# Patient Record
Sex: MALE | Race: BLACK OR AFRICAN AMERICAN | NOT HISPANIC OR LATINO | URBAN - METROPOLITAN AREA
[De-identification: names, ages, dates, MRNs, and addresses within clinical notes are randomized per-mention and may not be internally consistent; named-entity substitution may affect disease eponyms.]

---

## 2019-10-12 ENCOUNTER — INPATIENT (INPATIENT)
Facility: HOSPITAL | Age: 54
LOS: 9 days | Discharge: HOME HEALTH | DRG: 235 | End: 2019-10-22
Attending: THORACIC SURGERY (CARDIOTHORACIC VASCULAR SURGERY) | Admitting: THORACIC SURGERY (CARDIOTHORACIC VASCULAR SURGERY)
Payer: MEDICAID

## 2019-10-12 VITALS — TEMPERATURE: 98 F | WEIGHT: 187.39 LBS | HEIGHT: 69 IN

## 2019-10-12 LAB
ALBUMIN SERPL ELPH-MCNC: 3.4 G/DL — SIGNIFICANT CHANGE UP (ref 3.3–5)
ALP SERPL-CCNC: 89 U/L — SIGNIFICANT CHANGE UP (ref 40–120)
ALT FLD-CCNC: 32 U/L — SIGNIFICANT CHANGE UP (ref 10–45)
ANION GAP SERPL CALC-SCNC: 10 MMOL/L — SIGNIFICANT CHANGE UP (ref 5–17)
APTT BLD: 30.4 SEC — SIGNIFICANT CHANGE UP (ref 27.5–36.3)
AST SERPL-CCNC: 27 U/L — SIGNIFICANT CHANGE UP (ref 10–40)
BILIRUB SERPL-MCNC: <0.2 MG/DL — SIGNIFICANT CHANGE UP (ref 0.2–1.2)
BLD GP AB SCN SERPL QL: NEGATIVE — SIGNIFICANT CHANGE UP
BUN SERPL-MCNC: 22 MG/DL — SIGNIFICANT CHANGE UP (ref 7–23)
CALCIUM SERPL-MCNC: 8.5 MG/DL — SIGNIFICANT CHANGE UP (ref 8.4–10.5)
CHLORIDE SERPL-SCNC: 108 MMOL/L — SIGNIFICANT CHANGE UP (ref 96–108)
CHOLEST SERPL-MCNC: 121 MG/DL — SIGNIFICANT CHANGE UP (ref 10–199)
CK MB CFR SERPL CALC: 1.6 NG/ML — SIGNIFICANT CHANGE UP (ref 0–6.7)
CO2 SERPL-SCNC: 22 MMOL/L — SIGNIFICANT CHANGE UP (ref 22–31)
CREAT SERPL-MCNC: 0.9 MG/DL — SIGNIFICANT CHANGE UP (ref 0.5–1.3)
GLUCOSE BLDC GLUCOMTR-MCNC: 158 MG/DL — HIGH (ref 70–99)
GLUCOSE SERPL-MCNC: 196 MG/DL — HIGH (ref 70–99)
HBA1C BLD-MCNC: 6 % — HIGH (ref 4–5.6)
HCT VFR BLD CALC: 37.9 % — LOW (ref 39–50)
HDLC SERPL-MCNC: 40 MG/DL — SIGNIFICANT CHANGE UP
HGB BLD-MCNC: 12.5 G/DL — LOW (ref 13–17)
INR BLD: 1.15 — SIGNIFICANT CHANGE UP (ref 0.88–1.16)
LIPID PNL WITH DIRECT LDL SERPL: 64 MG/DL — SIGNIFICANT CHANGE UP
MAGNESIUM SERPL-MCNC: 1.4 MG/DL — LOW (ref 1.6–2.6)
MCHC RBC-ENTMCNC: 30.4 PG — SIGNIFICANT CHANGE UP (ref 27–34)
MCHC RBC-ENTMCNC: 33 GM/DL — SIGNIFICANT CHANGE UP (ref 32–36)
MCV RBC AUTO: 92.2 FL — SIGNIFICANT CHANGE UP (ref 80–100)
NRBC # BLD: 0 /100 WBCS — SIGNIFICANT CHANGE UP (ref 0–0)
NT-PROBNP SERPL-SCNC: 458 PG/ML — HIGH (ref 0–300)
PHOSPHATE SERPL-MCNC: 3.4 MG/DL — SIGNIFICANT CHANGE UP (ref 2.5–4.5)
PLATELET # BLD AUTO: 187 K/UL — SIGNIFICANT CHANGE UP (ref 150–400)
POTASSIUM SERPL-MCNC: 4 MMOL/L — SIGNIFICANT CHANGE UP (ref 3.5–5.3)
POTASSIUM SERPL-SCNC: 4 MMOL/L — SIGNIFICANT CHANGE UP (ref 3.5–5.3)
PROT SERPL-MCNC: 6.1 G/DL — SIGNIFICANT CHANGE UP (ref 6–8.3)
PROTHROM AB SERPL-ACNC: 13 SEC — HIGH (ref 10–12.9)
RBC # BLD: 4.11 M/UL — LOW (ref 4.2–5.8)
RBC # FLD: 11.9 % — SIGNIFICANT CHANGE UP (ref 10.3–14.5)
RH IG SCN BLD-IMP: POSITIVE — SIGNIFICANT CHANGE UP
RH IG SCN BLD-IMP: POSITIVE — SIGNIFICANT CHANGE UP
SODIUM SERPL-SCNC: 140 MMOL/L — SIGNIFICANT CHANGE UP (ref 135–145)
T4 AB SER-ACNC: 4.78 UG/DL — SIGNIFICANT CHANGE UP (ref 3.17–11.72)
TOTAL CHOLESTEROL/HDL RATIO MEASUREMENT: 3 RATIO — LOW (ref 3.4–9.6)
TRIGL SERPL-MCNC: 84 MG/DL — SIGNIFICANT CHANGE UP (ref 10–149)
TROPONIN T SERPL-MCNC: 0.12 NG/ML — CRITICAL HIGH (ref 0–0.01)
TSH SERPL-MCNC: 2.79 UIU/ML — SIGNIFICANT CHANGE UP (ref 0.35–4.94)
WBC # BLD: 6.95 K/UL — SIGNIFICANT CHANGE UP (ref 3.8–10.5)
WBC # FLD AUTO: 6.95 K/UL — SIGNIFICANT CHANGE UP (ref 3.8–10.5)

## 2019-10-12 PROCEDURE — 71045 X-RAY EXAM CHEST 1 VIEW: CPT | Mod: 26

## 2019-10-12 PROCEDURE — 99223 1ST HOSP IP/OBS HIGH 75: CPT

## 2019-10-12 RX ORDER — SODIUM CHLORIDE 9 MG/ML
1000 INJECTION, SOLUTION INTRAVENOUS
Refills: 0 | Status: DISCONTINUED | OUTPATIENT
Start: 2019-10-12 | End: 2019-10-15

## 2019-10-12 RX ORDER — GLUCAGON INJECTION, SOLUTION 0.5 MG/.1ML
1 INJECTION, SOLUTION SUBCUTANEOUS ONCE
Refills: 0 | Status: DISCONTINUED | OUTPATIENT
Start: 2019-10-12 | End: 2019-10-15

## 2019-10-12 RX ORDER — SODIUM CHLORIDE 9 MG/ML
3 INJECTION INTRAMUSCULAR; INTRAVENOUS; SUBCUTANEOUS EVERY 8 HOURS
Refills: 0 | Status: DISCONTINUED | OUTPATIENT
Start: 2019-10-12 | End: 2019-10-15

## 2019-10-12 RX ORDER — DEXTROSE 50 % IN WATER 50 %
15 SYRINGE (ML) INTRAVENOUS ONCE
Refills: 0 | Status: DISCONTINUED | OUTPATIENT
Start: 2019-10-12 | End: 2019-10-15

## 2019-10-12 RX ORDER — METOPROLOL TARTRATE 50 MG
25 TABLET ORAL
Refills: 0 | Status: DISCONTINUED | OUTPATIENT
Start: 2019-10-12 | End: 2019-10-13

## 2019-10-12 RX ORDER — DEXTROSE 50 % IN WATER 50 %
12.5 SYRINGE (ML) INTRAVENOUS ONCE
Refills: 0 | Status: DISCONTINUED | OUTPATIENT
Start: 2019-10-12 | End: 2019-10-15

## 2019-10-12 RX ORDER — DEXTROSE 50 % IN WATER 50 %
25 SYRINGE (ML) INTRAVENOUS ONCE
Refills: 0 | Status: DISCONTINUED | OUTPATIENT
Start: 2019-10-12 | End: 2019-10-15

## 2019-10-12 RX ORDER — INSULIN LISPRO 100/ML
VIAL (ML) SUBCUTANEOUS
Refills: 0 | Status: DISCONTINUED | OUTPATIENT
Start: 2019-10-12 | End: 2019-10-15

## 2019-10-12 RX ORDER — HEPARIN SODIUM 5000 [USP'U]/ML
5000 INJECTION INTRAVENOUS; SUBCUTANEOUS EVERY 8 HOURS
Refills: 0 | Status: DISCONTINUED | OUTPATIENT
Start: 2019-10-12 | End: 2019-10-13

## 2019-10-12 RX ORDER — ISOSORBIDE MONONITRATE 60 MG/1
30 TABLET, EXTENDED RELEASE ORAL DAILY
Refills: 0 | Status: DISCONTINUED | OUTPATIENT
Start: 2019-10-12 | End: 2019-10-13

## 2019-10-12 RX ORDER — PANTOPRAZOLE SODIUM 20 MG/1
40 TABLET, DELAYED RELEASE ORAL
Refills: 0 | Status: DISCONTINUED | OUTPATIENT
Start: 2019-10-12 | End: 2019-10-15

## 2019-10-12 RX ADMIN — Medication 2: at 22:05

## 2019-10-12 RX ADMIN — Medication 25 MILLIGRAM(S): at 23:02

## 2019-10-12 RX ADMIN — SODIUM CHLORIDE 3 MILLILITER(S): 9 INJECTION INTRAMUSCULAR; INTRAVENOUS; SUBCUTANEOUS at 22:06

## 2019-10-12 RX ADMIN — HEPARIN SODIUM 5000 UNIT(S): 5000 INJECTION INTRAVENOUS; SUBCUTANEOUS at 23:02

## 2019-10-13 DIAGNOSIS — I25.2 OLD MYOCARDIAL INFARCTION: ICD-10-CM

## 2019-10-13 DIAGNOSIS — E10.9 TYPE 1 DIABETES MELLITUS WITHOUT COMPLICATIONS: ICD-10-CM

## 2019-10-13 DIAGNOSIS — I10 ESSENTIAL (PRIMARY) HYPERTENSION: ICD-10-CM

## 2019-10-13 DIAGNOSIS — I25.110 ATHEROSCLEROTIC HEART DISEASE OF NATIVE CORONARY ARTERY WITH UNSTABLE ANGINA PECTORIS: ICD-10-CM

## 2019-10-13 LAB
APPEARANCE UR: CLEAR — SIGNIFICANT CHANGE UP
APTT BLD: 50.1 SEC — HIGH (ref 27.5–36.3)
APTT BLD: 52.4 SEC — HIGH (ref 27.5–36.3)
APTT BLD: 62.2 SEC — HIGH (ref 27.5–36.3)
BILIRUB UR-MCNC: NEGATIVE — SIGNIFICANT CHANGE UP
COLOR SPEC: YELLOW — SIGNIFICANT CHANGE UP
DIFF PNL FLD: NEGATIVE — SIGNIFICANT CHANGE UP
EXTRA GREEN TOP TUBE: SIGNIFICANT CHANGE UP
GLUCOSE BLDC GLUCOMTR-MCNC: 130 MG/DL — HIGH (ref 70–99)
GLUCOSE BLDC GLUCOMTR-MCNC: 143 MG/DL — HIGH (ref 70–99)
GLUCOSE BLDC GLUCOMTR-MCNC: 167 MG/DL — HIGH (ref 70–99)
GLUCOSE BLDC GLUCOMTR-MCNC: 188 MG/DL — HIGH (ref 70–99)
GLUCOSE UR QL: NEGATIVE — SIGNIFICANT CHANGE UP
HBA1C BLD-MCNC: 6.2 % — HIGH (ref 4–5.6)
HCT VFR BLD CALC: 37.5 % — LOW (ref 39–50)
HGB BLD-MCNC: 12.3 G/DL — LOW (ref 13–17)
KETONES UR-MCNC: NEGATIVE — SIGNIFICANT CHANGE UP
LEUKOCYTE ESTERASE UR-ACNC: NEGATIVE — SIGNIFICANT CHANGE UP
MCHC RBC-ENTMCNC: 30.7 PG — SIGNIFICANT CHANGE UP (ref 27–34)
MCHC RBC-ENTMCNC: 32.8 GM/DL — SIGNIFICANT CHANGE UP (ref 32–36)
MCV RBC AUTO: 93.5 FL — SIGNIFICANT CHANGE UP (ref 80–100)
NITRITE UR-MCNC: NEGATIVE — SIGNIFICANT CHANGE UP
NRBC # BLD: 0 /100 WBCS — SIGNIFICANT CHANGE UP (ref 0–0)
PCP SPEC-MCNC: SIGNIFICANT CHANGE UP
PH UR: 6 — SIGNIFICANT CHANGE UP (ref 5–8)
PLATELET # BLD AUTO: 167 K/UL — SIGNIFICANT CHANGE UP (ref 150–400)
PROT UR-MCNC: NEGATIVE MG/DL — SIGNIFICANT CHANGE UP
RBC # BLD: 4.01 M/UL — LOW (ref 4.2–5.8)
RBC # FLD: 12 % — SIGNIFICANT CHANGE UP (ref 10.3–14.5)
SP GR SPEC: 1.02 — SIGNIFICANT CHANGE UP (ref 1–1.03)
UROBILINOGEN FLD QL: 0.2 E.U./DL — SIGNIFICANT CHANGE UP
WBC # BLD: 7.31 K/UL — SIGNIFICANT CHANGE UP (ref 3.8–10.5)
WBC # FLD AUTO: 7.31 K/UL — SIGNIFICANT CHANGE UP (ref 3.8–10.5)

## 2019-10-13 PROCEDURE — 94010 BREATHING CAPACITY TEST: CPT | Mod: 26

## 2019-10-13 PROCEDURE — 93880 EXTRACRANIAL BILAT STUDY: CPT | Mod: 26

## 2019-10-13 RX ORDER — DILTIAZEM HCL 120 MG
30 CAPSULE, EXT RELEASE 24 HR ORAL THREE TIMES A DAY
Refills: 0 | Status: DISCONTINUED | OUTPATIENT
Start: 2019-10-13 | End: 2019-10-15

## 2019-10-13 RX ORDER — ATORVASTATIN CALCIUM 80 MG/1
20 TABLET, FILM COATED ORAL AT BEDTIME
Refills: 0 | Status: DISCONTINUED | OUTPATIENT
Start: 2019-10-13 | End: 2019-10-15

## 2019-10-13 RX ORDER — HEPARIN SODIUM 5000 [USP'U]/ML
INJECTION INTRAVENOUS; SUBCUTANEOUS
Qty: 25000 | Refills: 0 | Status: DISCONTINUED | OUTPATIENT
Start: 2019-10-13 | End: 2019-10-13

## 2019-10-13 RX ORDER — MAGNESIUM OXIDE 400 MG ORAL TABLET 241.3 MG
400 TABLET ORAL ONCE
Refills: 0 | Status: COMPLETED | OUTPATIENT
Start: 2019-10-13 | End: 2019-10-13

## 2019-10-13 RX ORDER — HEPARIN SODIUM 5000 [USP'U]/ML
1100 INJECTION INTRAVENOUS; SUBCUTANEOUS
Qty: 25000 | Refills: 0 | Status: DISCONTINUED | OUTPATIENT
Start: 2019-10-13 | End: 2019-10-14

## 2019-10-13 RX ORDER — CLOPIDOGREL BISULFATE 75 MG/1
75 TABLET, FILM COATED ORAL DAILY
Refills: 0 | Status: DISCONTINUED | OUTPATIENT
Start: 2019-10-13 | End: 2019-10-15

## 2019-10-13 RX ORDER — MORPHINE SULFATE 50 MG/1
4 CAPSULE, EXTENDED RELEASE ORAL
Refills: 0 | Status: DISCONTINUED | OUTPATIENT
Start: 2019-10-13 | End: 2019-10-15

## 2019-10-13 RX ORDER — DILTIAZEM HCL 120 MG
60 CAPSULE, EXT RELEASE 24 HR ORAL THREE TIMES A DAY
Refills: 0 | Status: DISCONTINUED | OUTPATIENT
Start: 2019-10-13 | End: 2019-10-13

## 2019-10-13 RX ORDER — MORPHINE SULFATE 50 MG/1
2 CAPSULE, EXTENDED RELEASE ORAL ONCE
Refills: 0 | Status: DISCONTINUED | OUTPATIENT
Start: 2019-10-13 | End: 2019-10-13

## 2019-10-13 RX ORDER — NITROGLYCERIN 6.5 MG
36.67 CAPSULE, EXTENDED RELEASE ORAL
Qty: 50 | Refills: 0 | Status: DISCONTINUED | OUTPATIENT
Start: 2019-10-13 | End: 2019-10-15

## 2019-10-13 RX ORDER — ACETAMINOPHEN 500 MG
650 TABLET ORAL EVERY 6 HOURS
Refills: 0 | Status: DISCONTINUED | OUTPATIENT
Start: 2019-10-13 | End: 2019-10-15

## 2019-10-13 RX ORDER — NITROGLYCERIN 6.5 MG
33.33 CAPSULE, EXTENDED RELEASE ORAL
Qty: 50 | Refills: 0 | Status: DISCONTINUED | OUTPATIENT
Start: 2019-10-13 | End: 2019-10-13

## 2019-10-13 RX ORDER — NITROGLYCERIN 6.5 MG
0.4 CAPSULE, EXTENDED RELEASE ORAL
Refills: 0 | Status: DISCONTINUED | OUTPATIENT
Start: 2019-10-13 | End: 2019-10-15

## 2019-10-13 RX ADMIN — MORPHINE SULFATE 2 MILLIGRAM(S): 50 CAPSULE, EXTENDED RELEASE ORAL at 01:54

## 2019-10-13 RX ADMIN — HEPARIN SODIUM 1150 UNIT(S)/HR: 5000 INJECTION INTRAVENOUS; SUBCUTANEOUS at 10:46

## 2019-10-13 RX ADMIN — Medication 12 MICROGRAM(S)/MIN: at 17:10

## 2019-10-13 RX ADMIN — Medication 30 MILLIGRAM(S): at 21:20

## 2019-10-13 RX ADMIN — ATORVASTATIN CALCIUM 20 MILLIGRAM(S): 80 TABLET, FILM COATED ORAL at 21:20

## 2019-10-13 RX ADMIN — Medication 650 MILLIGRAM(S): at 22:54

## 2019-10-13 RX ADMIN — HEPARIN SODIUM 11 UNIT(S)/HR: 5000 INJECTION INTRAVENOUS; SUBCUTANEOUS at 13:23

## 2019-10-13 RX ADMIN — Medication 10 MICROGRAM(S)/MIN: at 03:54

## 2019-10-13 RX ADMIN — Medication 2: at 21:37

## 2019-10-13 RX ADMIN — MAGNESIUM OXIDE 400 MG ORAL TABLET 400 MILLIGRAM(S): 241.3 TABLET ORAL at 04:22

## 2019-10-13 RX ADMIN — Medication 2: at 16:43

## 2019-10-13 RX ADMIN — SODIUM CHLORIDE 3 MILLILITER(S): 9 INJECTION INTRAMUSCULAR; INTRAVENOUS; SUBCUTANEOUS at 05:41

## 2019-10-13 RX ADMIN — SODIUM CHLORIDE 3 MILLILITER(S): 9 INJECTION INTRAMUSCULAR; INTRAVENOUS; SUBCUTANEOUS at 12:27

## 2019-10-13 RX ADMIN — Medication 650 MILLIGRAM(S): at 21:37

## 2019-10-13 RX ADMIN — HEPARIN SODIUM 1000 UNIT(S)/HR: 5000 INJECTION INTRAVENOUS; SUBCUTANEOUS at 04:01

## 2019-10-13 RX ADMIN — Medication 30 MILLIGRAM(S): at 13:46

## 2019-10-13 RX ADMIN — PANTOPRAZOLE SODIUM 40 MILLIGRAM(S): 20 TABLET, DELAYED RELEASE ORAL at 06:07

## 2019-10-13 RX ADMIN — CLOPIDOGREL BISULFATE 75 MILLIGRAM(S): 75 TABLET, FILM COATED ORAL at 12:33

## 2019-10-13 RX ADMIN — SODIUM CHLORIDE 3 MILLILITER(S): 9 INJECTION INTRAMUSCULAR; INTRAVENOUS; SUBCUTANEOUS at 21:21

## 2019-10-13 RX ADMIN — Medication 0.4 MILLIGRAM(S): at 01:55

## 2019-10-13 RX ADMIN — Medication 30 MILLIGRAM(S): at 05:40

## 2019-10-13 NOTE — H&P ADULT - NSICDXPASTMEDICALHX_GEN_ALL_CORE_FT
PAST MEDICAL HISTORY:  DM (diabetes mellitus)     H/O non-ST elevation myocardial infarction (NSTEMI)     H/O unstable angina     HTN (hypertension)

## 2019-10-13 NOTE — H&P ADULT - NSHPSOCIALHISTORY_GEN_ALL_CORE
current smoker 1/2 pack/day for 25 years  active cocaine user and marijuana user  lives alone and has a girlfriend  has an adult daughter  ex-marine currently works as a baker  5 steps to get to his apartment

## 2019-10-13 NOTE — PROGRESS NOTE ADULT - SUBJECTIVE AND OBJECTIVE BOX
Patient discussed on morning rounds with Dr. Lugo    pre-op CABG    SUBJECTIVE ASSESSMENT:  54y Male seen and examined at the bedside. Reports having intermitted CP and SOB and rest, improved somewhat with nitro drip. Denies radiation of pain, n/v, dizziness, palpitations. He reports a history with SOB at rest prior to his initial hospitalization. He admits to smoking, quit >1 month ago. IS emphasized to him, pulling 3500 today. Denies headache, dizziness, SOB, VASQUEZ, N/V/D/C, abd pain, calf pain, leg swelling         Vital Signs Last 24 Hrs  T(C): 36.4 (13 Oct 2019 13:41), Max: 36.9 (12 Oct 2019 20:15)  T(F): 97.6 (13 Oct 2019 13:41), Max: 98.5 (13 Oct 2019 10:22)  HR: 64 (13 Oct 2019 13:48) (54 - 68)  BP: 152/86 (13 Oct 2019 13:48) (128/68 - 182/94)  BP(mean): --  RR: 16 (13 Oct 2019 13:48) (16 - 18)  SpO2: 95% (13 Oct 2019 13:48) (95% - 98%)  I&O's Detail    12 Oct 2019 07:01  -  13 Oct 2019 07:00  --------------------------------------------------------  IN:    heparin  Infusion.: 40 mL    nitroglycerin  Infusion: 40 mL  Total IN: 80 mL    OUT:    Voided: 275 mL  Total OUT: 275 mL    Total NET: -195 mL      13 Oct 2019 07:01  -  13 Oct 2019 16:05  --------------------------------------------------------  IN:    heparin  Infusion.: 41.5 mL    heparin Infusion: 11 mL    nitroglycerin  Infusion: 40 mL    Oral Fluid: 240 mL  Total IN: 332.5 mL    OUT:  Total OUT: 0 mL    Total NET: 332.5 mL          CHEST TUBE:  no  YOSELYN DRAIN:  No.  EPICARDIAL WIRES: No.  TIE DOWNS: No.  SAGASTUME: No.    PHYSICAL EXAM:    General: Lying comfortably in bed, NAD    Neurological: A&O x3, no focal deficits, strength 5/5 throughout    Cardiovascular: RRR, normal s1 s2, no M/R/G    Respiratory: Non-labored breathing, chest expansion symmetric, CTA b/l, no W/R/R    Gastrointestinal: +BS x4 quadrant, soft, non-tender to palpation, non-distended    Extremities: WWP, no pitting edema, no calf tenderness or erythema    Vascular: 2+ radial pulses b/l, 2+DP pulses b/l    LABS:                        12.3   7.31  )-----------( 167      ( 13 Oct 2019 10:09 )             37.5       COUMADIN:  no    PT/INR - ( 12 Oct 2019 21:59 )   PT: 13.0 sec;   INR: 1.15          PTT - ( 13 Oct 2019 10:09 )  PTT:50.1 sec    10    140  |  108  |  22  ----------------------------<  196<H>  4.0   |  22  |  0.90    Ca    8.5      12 Oct 2019 21:59  Phos  3.4     10-12  Mg     1.4     10-12    TPro  6.1  /  Alb  3.4  /  TBili  <0.2  /  DBili  x   /  AST  27  /  ALT  32  /  AlkPhos  89  10-12      Urinalysis Basic - ( 13 Oct 2019 00:01 )    Color: Yellow / Appearance: Clear / S.025 / pH: x  Gluc: x / Ketone: NEGATIVE  / Bili: Negative / Urobili: 0.2 E.U./dL   Blood: x / Protein: NEGATIVE mg/dL / Nitrite: NEGATIVE   Leuk Esterase: NEGATIVE / RBC: x / WBC x   Sq Epi: x / Non Sq Epi: x / Bacteria: x        MEDICATIONS  (STANDING):  atorvastatin 20 milliGRAM(s) Oral at bedtime  clopidogrel Tablet 75 milliGRAM(s) Oral daily  dextrose 5%. 1000 milliLiter(s) (50 mL/Hr) IV Continuous <Continuous>  dextrose 50% Injectable 12.5 Gram(s) IV Push once  dextrose 50% Injectable 25 Gram(s) IV Push once  dextrose 50% Injectable 25 Gram(s) IV Push once  diltiazem    Tablet 30 milliGRAM(s) Oral three times a day  heparin  Infusion 1100 Unit(s)/Hr (11 mL/Hr) IV Continuous <Continuous>  insulin lispro (HumaLOG) corrective regimen sliding scale   SubCutaneous Before meals and at bedtime  nitroglycerin  Infusion 33.333 MICROgram(s)/Min (10 mL/Hr) IV Continuous <Continuous>  pantoprazole    Tablet 40 milliGRAM(s) Oral before breakfast  sodium chloride 0.9% lock flush 3 milliLiter(s) IV Push every 8 hours    MEDICATIONS  (PRN):  dextrose 40% Gel 15 Gram(s) Oral once PRN Blood Glucose LESS THAN 70 milliGRAM(s)/deciliter  glucagon  Injectable 1 milliGRAM(s) IntraMuscular once PRN Glucose LESS THAN 70 milligrams/deciliter  morphine  - Injectable 4 milliGRAM(s) IV Push every 3 hours PRN Moderate Pain (4 - 6)  nitroglycerin     SubLingual 0.4 milliGRAM(s) SubLingual every 5 minutes PRN Chest Pain        RADIOLOGY & ADDITIONAL TESTS:    < from: Xray Chest 1 View AP/PA (10.12.19 @ 21:45) >  Frontal examination of the chest demonstrates the heart to be within   normal limits in transverse diameter. No acute infiltrates. Mild   degenerative changes thoracic spine.    Impression: No acute infiltrates    < end of copied text >

## 2019-10-13 NOTE — H&P ADULT - NSHPPHYSICALEXAM_GEN_ALL_CORE
Physical Exam  CONSTITUTIONAL:                                                              multiple tattoos all over his body   NEURO:                                                                       WNL                      EYES:                                                                                WNL  ENMT:                                                                               WNL  CV:                                                                                   WNL  RESPIRATORY:                                                                 WNL  GI:                                                                                     WNL  : SAGASTUME + / -                                                                  WNL  MUSCULOSKELETAL:                                                       WNL  SKIN / BREAST:                                                                  WNL  EXTREMITIES:                                                                WNL

## 2019-10-13 NOTE — H&P ADULT - NSHPREVIEWOFSYSTEMS_GEN_ALL_CORE
Review of Systems  CONSTITUTIONAL:  Denies Fevers / chills, sweats, fatigue, weight loss, weight gain                                      NEURO:  Denies paresthesias, seizures, syncope, confusion                                                                                EYES:  Denies Blurry vision, discharge, pain, loss of vision                                                                                    ENMT:  Denies Difficulty hearing, vertigo, dysphagia, epistaxis, recent dental work                                       CV:  Chest pain VASQUEZ                                                                                    RESPIRATORY:  Denies Wheezing, SOB, cough / sputum, hemoptysis                                                                GI:  Denies Nausea, vomiting, diarrhea, constipation, melena, difficulty swallowing                                               : Denies Hematuria, dysuria, urgency, incontinence                                                                                         MUSCULOSKELETAL:  Denies arthritis, joint swelling, muscle weakness                                                             SKIN/BREAST:  Denies rash, itching, hair loss, masses                                                                                            PSYCH:  Denies depression, anxiety, suicidal ideation                                                                                               HEME/LYMPH:  Denies bruises easily, enlarged lymph nodes, tender lymph nodes                                        ENDOCRINE:  Denies cold intolerance, heat intolerance, polydipsia

## 2019-10-13 NOTE — H&P ADULT - ASSESSMENT
55 y/o male current cocaine and marijuana user PMH CAD (s/p PCI 9/19), DM, HTN, HLD who presented originally to [facility] ED a few weeks ago with retrosternal chest pain where he ruled in for NSTEMI and states he received " two stents". He states he was supposed to follow up for surgery evaluation but his insurance lapsed and there were scheduling problems due to this issue. A few days ago he was walking in the park and felt severe chest pain while walking. Made it home and had breakfast but the chest pain became 8/10 retrosternal pressure/pain. Denies SOB, N&V, palpitations. EMS called and taken to [facility] ED where he ruled in for NSTEMI (T wave inversion lateral leads). Subsequent Cath showed 3 vessel CAD with EF 65%- ostial LAD 70%;pLAD 30%;mRCA 70%;Right posterior lateral segment 70%; patent stent in OMB

## 2019-10-13 NOTE — PROGRESS NOTE ADULT - ASSESSMENT
55 y/o male current cocaine and marijuana user PMH CAD (s/p PCI 9/19), DM, HTN, HLD who presented originally to [facility] ED a few weeks ago with retrosternal chest pain where he ruled in for NSTEMI and states he received " two stents". He states he was supposed to follow up for surgery evaluation but his insurance lapsed and there were scheduling problems due to this issue. A few days ago he was walking in the park and felt severe chest pain while walking. Made it home and had breakfast but the chest pain became 8/10 retrosternal pressure/pain. Denies SOB, N&V, palpitations. EMS called and taken to [facility] ED where he ruled in for NSTEMI (T wave inversion lateral leads). Subsequent Cath showed 3 vessel CAD with EF 65%- ostial LAD 70%;pLAD 30%;mRCA 70%;Right posterior lateral segment 70%; patent stent in OMB. Pt was transferred here for surgical evaluation. He is currently undergoing PST, plan for MIDCAB Tuesday    Neuro: No active issues  - No delirium Mentating well    Respiratory: Hx of smoking  - Will obtain RA ABG  - CXR showing no acute infiltrates   - SpO2 97% on 2L NC, continue O2 on NC  - Encourage ambulation C+DB and use of IS 10x / hr while awake    Cardiac: pre-op MIDCAB  - Admitted with troponin 0.12,    - CP overnight, Continue nitro drip, titrate as needed. SL nitro, morphine PRN  - Cont heparin drip for CAD, f/u PTTs  - F/u PST: still pending Echo, PFTs, RA ABG  - Carotids completed, f/u read  - Hx of recent PCI, cont asa, plavix  - Continue diltiazem 30 tid  - Cont statin, no BB in setting of active cocaine use  - Pt in NSR    GI: no active issues  - PO diet  - Continue bowel regimen  - GI PPX with protonix     Renal/: BUN/Cr 22/0.9  - Monitor renal function  - Monitor I/Os  - Replete K<4.0, Mg<2.0    Heme: H&H 12.5/37.9  - H&H stable, continue to monitor   - DVT ppx with heparin drip, SCD    Endocrinology  - A1C 6.2, cont ISS, FS well controlled today (143, 130)  - TSH WNL     ID: afebrile WBC 6.9  - Observe for SIRS/Sepsis Syndrome    Dispo: MIDCAB Tuesday 55 y/o male current cocaine and marijuana user PMH CAD (s/p PCI 9/19), DM, HTN, HLD who presented originally to [facility] ED a few weeks ago with retrosternal chest pain where he ruled in for NSTEMI and states he received " two stents". He states he was supposed to follow up for surgery evaluation but his insurance lapsed and there were scheduling problems due to this issue. A few days ago he was walking in the park and felt severe chest pain while walking. Made it home and had breakfast but the chest pain became 8/10 retrosternal pressure/pain. Denies SOB, N&V, palpitations. EMS called and taken to [facility] ED where he ruled in for NSTEMI (T wave inversion lateral leads). Subsequent Cath showed 3 vessel CAD with EF 65%- ostial LAD 70%;pLAD 30%;mRCA 70%;Right posterior lateral segment 70%; patent stent in OMB. Pt was transferred here for surgical evaluation. He is currently undergoing PST, plan for MIDCAB Tuesday    Neuro: No active issues  - No delirium Mentating well    Respiratory: Hx of smoking  - Will obtain RA ABG  - CXR showing no acute infiltrates   - SpO2 97% on 2L NC, continue O2 on NC  - Encourage ambulation C+DB and use of IS 10x / hr while awake    Cardiac: pre-op MIDCAB  - Admitted with troponin 0.12,    - CP overnight, Continue nitro drip, titrate as needed. SL nitro, morphine PRN  - Cont heparin drip for CAD, f/u PTTs  - F/u PST: still pending Echo, PFTs, RA ABG  - Carotids completed, f/u read  - Hx of recent PCI, cont asa, plavix  - Continue diltiazem 30 tid  - Cont statin, no BB in setting of active cocaine use  - Pt in NSR    GI: no active issues  - PO diet  - Continue bowel regimen  - GI PPX with protonix     Renal/: BUN/Cr 22/0.9  - UA neg, obtain Utox  - Monitor renal function  - Monitor I/Os  - Replete K<4.0, Mg<2.0    Heme: H&H 12.5/37.9  - H&H stable, continue to monitor   - DVT ppx with heparin drip, SCD    Endocrinology  - A1C 6.2, cont ISS, FS well controlled today (143, 130)  - TSH WNL     ID: afebrile WBC 6.9  - Observe for SIRS/Sepsis Syndrome    Dispo: MIDCAB Tuesday

## 2019-10-13 NOTE — H&P ADULT - HISTORY OF PRESENT ILLNESS
55 y/o male current cocaine and marijuana user [facility] CAD (s/p PCI 9/19), DM, HTN, HLD who presented originally to [facility] ED a few weeks ago with retrosternal chest pain where he ruled in for NSTEMI and states he received " two stents". He states he was supposed to follow up for surgery evaluation but his insurance lapsed and there were scheduling problems due to this issue. A few days ago he was walking in the park and felt severe chest pain while walking. Made it home and had breakfast but the chest pain became 8/10 retrosternal pressure/pain. Denies SOB, N&V, palpitations. EMS called and taken to [facility] ED where he ruled in for NSTEMI (T wave inversion lateral leads). Subsequent Cath showed 3 vessel CAD with EF 65%- ostial LAD 70%;pLAD 30%;mRCA 70%;Right posterior lateral segment 70%; patent stent in OMB. Transferred to [facility] CTS under Dr. Lugo for MIDCAB evaluation and management.

## 2019-10-14 LAB
ANION GAP SERPL CALC-SCNC: 8 MMOL/L — SIGNIFICANT CHANGE UP (ref 5–17)
APTT BLD: 48.1 SEC — HIGH (ref 27.5–36.3)
APTT BLD: 56.7 SEC — HIGH (ref 27.5–36.3)
APTT BLD: 60.1 SEC — HIGH (ref 27.5–36.3)
BASE EXCESS BLDA CALC-SCNC: 0.6 MMOL/L — SIGNIFICANT CHANGE UP (ref -2–3)
BASOPHILS # BLD AUTO: 0.06 K/UL — SIGNIFICANT CHANGE UP (ref 0–0.2)
BASOPHILS NFR BLD AUTO: 0.8 % — SIGNIFICANT CHANGE UP (ref 0–2)
BUN SERPL-MCNC: 21 MG/DL — SIGNIFICANT CHANGE UP (ref 7–23)
CALCIUM SERPL-MCNC: 8.6 MG/DL — SIGNIFICANT CHANGE UP (ref 8.4–10.5)
CHLORIDE SERPL-SCNC: 108 MMOL/L — SIGNIFICANT CHANGE UP (ref 96–108)
CO2 SERPL-SCNC: 23 MMOL/L — SIGNIFICANT CHANGE UP (ref 22–31)
CREAT SERPL-MCNC: 0.89 MG/DL — SIGNIFICANT CHANGE UP (ref 0.5–1.3)
EOSINOPHIL # BLD AUTO: 0.36 K/UL — SIGNIFICANT CHANGE UP (ref 0–0.5)
EOSINOPHIL NFR BLD AUTO: 4.6 % — SIGNIFICANT CHANGE UP (ref 0–6)
GLUCOSE BLDC GLUCOMTR-MCNC: 128 MG/DL — HIGH (ref 70–99)
GLUCOSE BLDC GLUCOMTR-MCNC: 131 MG/DL — HIGH (ref 70–99)
GLUCOSE BLDC GLUCOMTR-MCNC: 149 MG/DL — HIGH (ref 70–99)
GLUCOSE BLDC GLUCOMTR-MCNC: 194 MG/DL — HIGH (ref 70–99)
GLUCOSE SERPL-MCNC: 128 MG/DL — HIGH (ref 70–99)
HCO3 BLDA-SCNC: 24 MMOL/L — SIGNIFICANT CHANGE UP (ref 21–28)
HCT VFR BLD CALC: 38.7 % — LOW (ref 39–50)
HCV AB S/CO SERPL IA: 0.03 S/CO — SIGNIFICANT CHANGE UP
HCV AB SERPL-IMP: SIGNIFICANT CHANGE UP
HGB BLD-MCNC: 12.6 G/DL — LOW (ref 13–17)
IMM GRANULOCYTES NFR BLD AUTO: 0.3 % — SIGNIFICANT CHANGE UP (ref 0–1.5)
INR BLD: 1.15 — SIGNIFICANT CHANGE UP (ref 0.88–1.16)
LYMPHOCYTES # BLD AUTO: 2.23 K/UL — SIGNIFICANT CHANGE UP (ref 1–3.3)
LYMPHOCYTES # BLD AUTO: 28.2 % — SIGNIFICANT CHANGE UP (ref 13–44)
MAGNESIUM SERPL-MCNC: 1.4 MG/DL — LOW (ref 1.6–2.6)
MCHC RBC-ENTMCNC: 30.5 PG — SIGNIFICANT CHANGE UP (ref 27–34)
MCHC RBC-ENTMCNC: 32.6 GM/DL — SIGNIFICANT CHANGE UP (ref 32–36)
MCV RBC AUTO: 93.7 FL — SIGNIFICANT CHANGE UP (ref 80–100)
MONOCYTES # BLD AUTO: 0.51 K/UL — SIGNIFICANT CHANGE UP (ref 0–0.9)
MONOCYTES NFR BLD AUTO: 6.4 % — SIGNIFICANT CHANGE UP (ref 2–14)
NEUTROPHILS # BLD AUTO: 4.73 K/UL — SIGNIFICANT CHANGE UP (ref 1.8–7.4)
NEUTROPHILS NFR BLD AUTO: 59.7 % — SIGNIFICANT CHANGE UP (ref 43–77)
NRBC # BLD: 0 /100 WBCS — SIGNIFICANT CHANGE UP (ref 0–0)
PCO2 BLDA: 36 MMHG — SIGNIFICANT CHANGE UP (ref 35–48)
PH BLDA: 7.44 — SIGNIFICANT CHANGE UP (ref 7.35–7.45)
PLATELET # BLD AUTO: 172 K/UL — SIGNIFICANT CHANGE UP (ref 150–400)
PO2 BLDA: 88 MMHG — SIGNIFICANT CHANGE UP (ref 83–108)
POTASSIUM SERPL-MCNC: 3.9 MMOL/L — SIGNIFICANT CHANGE UP (ref 3.5–5.3)
POTASSIUM SERPL-SCNC: 3.9 MMOL/L — SIGNIFICANT CHANGE UP (ref 3.5–5.3)
PROTHROM AB SERPL-ACNC: 13 SEC — HIGH (ref 10–12.9)
RBC # BLD: 4.13 M/UL — LOW (ref 4.2–5.8)
RBC # FLD: 12.1 % — SIGNIFICANT CHANGE UP (ref 10.3–14.5)
SAO2 % BLDA: 97 % — SIGNIFICANT CHANGE UP (ref 95–100)
SODIUM SERPL-SCNC: 139 MMOL/L — SIGNIFICANT CHANGE UP (ref 135–145)
TROPONIN T SERPL-MCNC: 0.07 NG/ML — CRITICAL HIGH (ref 0–0.01)
WBC # BLD: 7.91 K/UL — SIGNIFICANT CHANGE UP (ref 3.8–10.5)
WBC # FLD AUTO: 7.91 K/UL — SIGNIFICANT CHANGE UP (ref 3.8–10.5)

## 2019-10-14 PROCEDURE — 93306 TTE W/DOPPLER COMPLETE: CPT | Mod: 26

## 2019-10-14 RX ORDER — CHLORHEXIDINE GLUCONATE 213 G/1000ML
1 SOLUTION TOPICAL ONCE
Refills: 0 | Status: COMPLETED | OUTPATIENT
Start: 2019-10-14 | End: 2019-10-14

## 2019-10-14 RX ORDER — CHLORHEXIDINE GLUCONATE 213 G/1000ML
5 SOLUTION TOPICAL ONCE
Refills: 0 | Status: COMPLETED | OUTPATIENT
Start: 2019-10-14 | End: 2019-10-15

## 2019-10-14 RX ORDER — MAGNESIUM OXIDE 400 MG ORAL TABLET 241.3 MG
800 TABLET ORAL ONCE
Refills: 0 | Status: COMPLETED | OUTPATIENT
Start: 2019-10-14 | End: 2019-10-14

## 2019-10-14 RX ORDER — CHLORHEXIDINE GLUCONATE 213 G/1000ML
1 SOLUTION TOPICAL ONCE
Refills: 0 | Status: COMPLETED | OUTPATIENT
Start: 2019-10-15 | End: 2019-10-15

## 2019-10-14 RX ORDER — HEPARIN SODIUM 5000 [USP'U]/ML
1300 INJECTION INTRAVENOUS; SUBCUTANEOUS
Qty: 25000 | Refills: 0 | Status: DISCONTINUED | OUTPATIENT
Start: 2019-10-14 | End: 2019-10-15

## 2019-10-14 RX ORDER — METOPROLOL TARTRATE 50 MG
12.5 TABLET ORAL EVERY 12 HOURS
Refills: 0 | Status: DISCONTINUED | OUTPATIENT
Start: 2019-10-14 | End: 2019-10-15

## 2019-10-14 RX ORDER — POTASSIUM CHLORIDE 20 MEQ
20 PACKET (EA) ORAL ONCE
Refills: 0 | Status: COMPLETED | OUTPATIENT
Start: 2019-10-14 | End: 2019-10-14

## 2019-10-14 RX ADMIN — Medication 20 MILLIEQUIVALENT(S): at 09:47

## 2019-10-14 RX ADMIN — Medication 650 MILLIGRAM(S): at 09:48

## 2019-10-14 RX ADMIN — Medication 30 MILLIGRAM(S): at 14:31

## 2019-10-14 RX ADMIN — CHLORHEXIDINE GLUCONATE 1 APPLICATION(S): 213 SOLUTION TOPICAL at 22:27

## 2019-10-14 RX ADMIN — Medication 30 MILLIGRAM(S): at 05:43

## 2019-10-14 RX ADMIN — Medication 12.5 MILLIGRAM(S): at 18:26

## 2019-10-14 RX ADMIN — Medication 2: at 22:26

## 2019-10-14 RX ADMIN — MAGNESIUM OXIDE 400 MG ORAL TABLET 800 MILLIGRAM(S): 241.3 TABLET ORAL at 09:47

## 2019-10-14 RX ADMIN — CHLORHEXIDINE GLUCONATE 1 APPLICATION(S): 213 SOLUTION TOPICAL at 19:20

## 2019-10-14 RX ADMIN — SODIUM CHLORIDE 3 MILLILITER(S): 9 INJECTION INTRAMUSCULAR; INTRAVENOUS; SUBCUTANEOUS at 05:43

## 2019-10-14 RX ADMIN — HEPARIN SODIUM 13 UNIT(S)/HR: 5000 INJECTION INTRAVENOUS; SUBCUTANEOUS at 23:51

## 2019-10-14 RX ADMIN — SODIUM CHLORIDE 3 MILLILITER(S): 9 INJECTION INTRAMUSCULAR; INTRAVENOUS; SUBCUTANEOUS at 21:04

## 2019-10-14 RX ADMIN — PANTOPRAZOLE SODIUM 40 MILLIGRAM(S): 20 TABLET, DELAYED RELEASE ORAL at 05:43

## 2019-10-14 RX ADMIN — SODIUM CHLORIDE 3 MILLILITER(S): 9 INJECTION INTRAMUSCULAR; INTRAVENOUS; SUBCUTANEOUS at 14:30

## 2019-10-14 RX ADMIN — Medication 30 MILLIGRAM(S): at 21:03

## 2019-10-14 RX ADMIN — CLOPIDOGREL BISULFATE 75 MILLIGRAM(S): 75 TABLET, FILM COATED ORAL at 11:36

## 2019-10-14 RX ADMIN — ATORVASTATIN CALCIUM 20 MILLIGRAM(S): 80 TABLET, FILM COATED ORAL at 21:03

## 2019-10-14 NOTE — PROVIDER CONTACT NOTE (CRITICAL VALUE NOTIFICATION) - NS PROVIDER READ BACK
Patient called with complaints of Shoulder pain and numbness. His throat also hurts when he coughs.  Denies SOB, CP, Dizziness etc. Patient was scheduled for acute visit tomorrow 2/16/18 Advised patient to call 911 if he has any worsening symptoms , CP , SOB, dizziness etc patient verbalized understanding
yes
yes
(0) independent

## 2019-10-14 NOTE — PRE-OP CHECKLIST - SELECT TESTS ORDERED
Sickle Cell (black patients 3mos-10yr)/INR/EKG/see results tab/CBC/Type and Screen/BMP/PT/PTT/POCT Blood Glucose

## 2019-10-14 NOTE — PROGRESS NOTE ADULT - ASSESSMENT
53 y/o male current cocaine and marijuana user [facility] CAD (s/p PCI 9/19), DM, HTN, HLD who presented originally to [facility] ED a few weeks ago with retrosternal chest pain where he ruled in for NSTEMI and states he received " two stents". He states he was supposed to follow up for surgery evaluation but his insurance lapsed and there were scheduling problems due to this issue. A few days ago he was walking in the park and felt severe chest pain while walking. Made it home and had breakfast but the chest pain became 8/10 retrosternal pressure/pain. Denies SOB, N&V, palpitations. EMS called and taken to [facility] ED where he ruled in for NSTEMI (T wave inversion lateral leads). Subsequent Cath showed 3 vessel CAD with EF 65%- ostial LAD 70%;pLAD 30%;mRCA 70%;Right posterior lateral segment 70%; patent stent in OMB. Transferred to [facility] CTS under Dr. Lugo for MIDCAB evaluation and management.    - MIDCAB tomorrow  - NPO after MN   - Blood products ordered  - Pre-op orders place  - Consent in chart

## 2019-10-14 NOTE — PROGRESS NOTE ADULT - SUBJECTIVE AND OBJECTIVE BOX
Planned Date of Surgery:       10/15/19                                                                                                           Surgeon: Dr. Lugo    Procedure: MIDCAB    HPI:  55 y/o male current cocaine and marijuana user PMH CAD (s/p PCI ), DM, HTN, HLD who presented originally to [facility] ED a few weeks ago with retrosternal chest pain where he ruled in for NSTEMI and states he received " two stents". He states he was supposed to follow up for surgery evaluation but his insurance lapsed and there were scheduling problems due to this issue. A few days ago he was walking in the park and felt severe chest pain while walking. Made it home and had breakfast but the chest pain became 8/10 retrosternal pressure/pain. Denies SOB, N&V, palpitations. EMS called and taken to [facility] ED where he ruled in for NSTEMI (T wave inversion lateral leads). Subsequent Cath showed 3 vessel CAD with EF 65%- ostial LAD 70%;pLAD 30%;mRCA 70%;Right posterior lateral segment 70%; patent stent in OMB. Transferred to [facility] CTS under Dr. Lugo for MIDCAB evaluation and management.    PAST MEDICAL & SURGICAL HISTORY:  H/O unstable angina  DM (diabetes mellitus)  H/O non-ST elevation myocardial infarction (NSTEMI)  HTN (hypertension)  No significant past surgical history      Morphine Sulfate (Urticaria)      Physical Exam  T(C): 36.7 (10-14-19 @ 14:39), Max: 36.7 (10-14-19 @ 14:39)  HR: 59 (10-14-19 @ 16:34) (59 - 67)  BP: 139/77 (10-14-19 @ 16:34) (134/69 - 166/86)  RR: 18 (10-14-19 @ 16:34) (16 - 18)  SpO2: 98% (10-14-19 @ 16:34) (94% - 98%)    General: Lying comfortably in bed, NAD    Neurological: A&O x3, no focal deficits, strength 5/5 throughout    Chest: no pectus    Cardiovascular: RRR, normal s1 s2, no M/R/G    Respiratory: Non-labored breathing, chest expansion symmetric, CTA b/l, no W/R/R    Gastrointestinal: +BS x4 quadrant, soft, non-tender to palpation, non-distended    Extremities: WWP, no pitting edema, no calf tenderness or erythema, no venous varicosities    Vascular: 2+ radial pulses 2+DP pulses b/l        MEDICATIONS  (STANDING):  atorvastatin 20 milliGRAM(s) Oral at bedtime  chlorhexidine 0.12% Liquid 5 milliLiter(s) Swish and Spit once  chlorhexidine 4% Liquid 1 Application(s) Topical once  chlorhexidine 4% Liquid 1 Application(s) Topical once  clopidogrel Tablet 75 milliGRAM(s) Oral daily  dextrose 5%. 1000 milliLiter(s) (50 mL/Hr) IV Continuous <Continuous>  dextrose 50% Injectable 12.5 Gram(s) IV Push once  dextrose 50% Injectable 25 Gram(s) IV Push once  dextrose 50% Injectable 25 Gram(s) IV Push once  diltiazem    Tablet 30 milliGRAM(s) Oral three times a day  heparin  Infusion 1300 Unit(s)/Hr (13 mL/Hr) IV Continuous <Continuous>  insulin lispro (HumaLOG) corrective regimen sliding scale   SubCutaneous Before meals and at bedtime  metoprolol tartrate 12.5 milliGRAM(s) Oral every 12 hours  nitroglycerin  Infusion 36.667 MICROgram(s)/Min (11 mL/Hr) IV Continuous <Continuous>  pantoprazole    Tablet 40 milliGRAM(s) Oral before breakfast  sodium chloride 0.9% lock flush 3 milliLiter(s) IV Push every 8 hours    MEDICATIONS  (PRN):  acetaminophen   Tablet .. 650 milliGRAM(s) Oral every 6 hours PRN Mild Pain (1 - 3)  dextrose 40% Gel 15 Gram(s) Oral once PRN Blood Glucose LESS THAN 70 milliGRAM(s)/deciliter  glucagon  Injectable 1 milliGRAM(s) IntraMuscular once PRN Glucose LESS THAN 70 milligrams/deciliter  morphine  - Injectable 4 milliGRAM(s) IV Push every 3 hours PRN Moderate Pain (4 - 6)  nitroglycerin     SubLingual 0.4 milliGRAM(s) SubLingual every 5 minutes PRN Chest Pain      On Beta Blocker? YES     Labs:                        12.6   7.91  )-----------( 172      ( 14 Oct 2019 06:24 )             38.7     10-14    139  |  108  |  21  ----------------------------<  128<H>  3.9   |  23  |  0.89    Ca    8.6      14 Oct 2019 06:24  Phos  3.4     10-12  Mg     1.4     10-    TPro  6.1  /  Alb  3.4  /  TBili  <0.2  /  DBili  x   /  AST  27  /  ALT  32  /  AlkPhos  89  10-12    PT/INR - ( 14 Oct 2019 06:24 )   PT: 13.0 sec;   INR: 1.15          PTT - ( 14 Oct 2019 15:40 )  PTT:60.1 sec  Urinalysis Basic - ( 13 Oct 2019 00:01 )    Color: Yellow / Appearance: Clear / S.025 / pH: x  Gluc: x / Ketone: NEGATIVE  / Bili: Negative / Urobili: 0.2 E.U./dL   Blood: x / Protein: NEGATIVE mg/dL / Nitrite: NEGATIVE   Leuk Esterase: NEGATIVE / RBC: x / WBC x   Sq Epi: x / Non Sq Epi: x / Bacteria: x      ABO Interpretation: A (10-14-19 @ 08:41)    ABG - ( 14 Oct 2019 15:40 )  pH, Arterial: 7.44  pH, Blood: x     /  pCO2: 36    /  pO2: 88    / HCO3: 24    / Base Excess: 0.6   /  SaO2: 97                CARDIAC MARKERS ( 14 Oct 2019 06:24 )  x     / 0.07 ng/mL / x     / x     / x      CARDIAC MARKERS ( 12 Oct 2019 21:59 )  x     / 0.12 ng/mL / 34 U/L / x     / 1.6 ng/mL          Hgb A1C: 6.0    EKG: NSR    CXR: < from: Xray Chest 1 View AP/PA (10.12.19 @ 21:45) >    INTERPRETATION:  Clinical History: Preop    Frontal examination of the chest demonstrates the heart to be within   normal limits in transverse diameter. No acute infiltrates. Mild   degenerative changes thoracic spine.    Impression: No acute infiltrates    < end of copied text >    CT Scans:     Cath Report: EF 65%- ostial LAD 70%;pLAD 30%;mRCA 70%    Echo: < from: Echocardiogram (10.14.19 @ 13:49) >  1. Normal left and right ventricular size and systolic function.   2. No significant valvular disease.   3. No pericardial effusion.   4. No evidence of pulmonary hypertension.    < end of copied text >      PFT's:     Carotid Duplex: < from: US Duplex Carotid Arteries Complete, Bilateral (10.13.19 @ 15:16) >  VERTEBRAL ARTERIES:   Antegrade flow was seen within both vertebral arteries.      IMPRESSION: Minimal plaque bilaterally. No hemodynamically significant   stenosis.       < end of copied text >      Consult in Chart?  YES   Consent in Chart? YES   Pre-op Orders Placed? YES  Blood Prodeucts Ordered? YES   NPO ordered? YES

## 2019-10-15 ENCOUNTER — APPOINTMENT (OUTPATIENT)
Dept: CARDIOTHORACIC SURGERY | Facility: HOSPITAL | Age: 54
End: 2019-10-15
Payer: MEDICAID

## 2019-10-15 PROBLEM — Z00.00 ENCOUNTER FOR PREVENTIVE HEALTH EXAMINATION: Status: ACTIVE | Noted: 2019-10-15

## 2019-10-15 LAB
ALBUMIN SERPL ELPH-MCNC: 3.2 G/DL — LOW (ref 3.3–5)
ALBUMIN SERPL ELPH-MCNC: 4 G/DL — SIGNIFICANT CHANGE UP (ref 3.3–5)
ALP SERPL-CCNC: 76 U/L — SIGNIFICANT CHANGE UP (ref 40–120)
ALP SERPL-CCNC: 80 U/L — SIGNIFICANT CHANGE UP (ref 40–120)
ALT FLD-CCNC: 75 U/L — HIGH (ref 10–45)
ALT FLD-CCNC: 82 U/L — HIGH (ref 10–45)
ANION GAP SERPL CALC-SCNC: 9 MMOL/L — SIGNIFICANT CHANGE UP (ref 5–17)
ANION GAP SERPL CALC-SCNC: 9 MMOL/L — SIGNIFICANT CHANGE UP (ref 5–17)
APTT BLD: 26.6 SEC — LOW (ref 27.5–36.3)
APTT BLD: 27.3 SEC — LOW (ref 27.5–36.3)
AST SERPL-CCNC: 61 U/L — HIGH (ref 10–40)
AST SERPL-CCNC: 70 U/L — HIGH (ref 10–40)
BASE EXCESS BLDA CALC-SCNC: 0.4 MMOL/L — SIGNIFICANT CHANGE UP (ref -2–3)
BASOPHILS # BLD AUTO: 0.02 K/UL — SIGNIFICANT CHANGE UP (ref 0–0.2)
BASOPHILS # BLD AUTO: 0.06 K/UL — SIGNIFICANT CHANGE UP (ref 0–0.2)
BASOPHILS NFR BLD AUTO: 0.2 % — SIGNIFICANT CHANGE UP (ref 0–2)
BASOPHILS NFR BLD AUTO: 0.4 % — SIGNIFICANT CHANGE UP (ref 0–2)
BILIRUB SERPL-MCNC: 0.3 MG/DL — SIGNIFICANT CHANGE UP (ref 0.2–1.2)
BILIRUB SERPL-MCNC: 0.5 MG/DL — SIGNIFICANT CHANGE UP (ref 0.2–1.2)
BUN SERPL-MCNC: 19 MG/DL — SIGNIFICANT CHANGE UP (ref 7–23)
BUN SERPL-MCNC: 21 MG/DL — SIGNIFICANT CHANGE UP (ref 7–23)
CALCIUM SERPL-MCNC: 8.6 MG/DL — SIGNIFICANT CHANGE UP (ref 8.4–10.5)
CALCIUM SERPL-MCNC: 9 MG/DL — SIGNIFICANT CHANGE UP (ref 8.4–10.5)
CHLORIDE SERPL-SCNC: 106 MMOL/L — SIGNIFICANT CHANGE UP (ref 96–108)
CHLORIDE SERPL-SCNC: 107 MMOL/L — SIGNIFICANT CHANGE UP (ref 96–108)
CO2 SERPL-SCNC: 23 MMOL/L — SIGNIFICANT CHANGE UP (ref 22–31)
CO2 SERPL-SCNC: 26 MMOL/L — SIGNIFICANT CHANGE UP (ref 22–31)
CREAT SERPL-MCNC: 1.01 MG/DL — SIGNIFICANT CHANGE UP (ref 0.5–1.3)
CREAT SERPL-MCNC: 1.12 MG/DL — SIGNIFICANT CHANGE UP (ref 0.5–1.3)
EOSINOPHIL # BLD AUTO: 0.06 K/UL — SIGNIFICANT CHANGE UP (ref 0–0.5)
EOSINOPHIL # BLD AUTO: 0.28 K/UL — SIGNIFICANT CHANGE UP (ref 0–0.5)
EOSINOPHIL NFR BLD AUTO: 0.5 % — SIGNIFICANT CHANGE UP (ref 0–6)
EOSINOPHIL NFR BLD AUTO: 1.6 % — SIGNIFICANT CHANGE UP (ref 0–6)
EXTRA GREEN TOP TUBE: SIGNIFICANT CHANGE UP
GAS PNL BLDA: SIGNIFICANT CHANGE UP
GLUCOSE BLDC GLUCOMTR-MCNC: 109 MG/DL — HIGH (ref 70–99)
GLUCOSE BLDC GLUCOMTR-MCNC: 125 MG/DL — HIGH (ref 70–99)
GLUCOSE BLDC GLUCOMTR-MCNC: 140 MG/DL — HIGH (ref 70–99)
GLUCOSE BLDC GLUCOMTR-MCNC: 159 MG/DL — HIGH (ref 70–99)
GLUCOSE SERPL-MCNC: 148 MG/DL — HIGH (ref 70–99)
GLUCOSE SERPL-MCNC: 213 MG/DL — HIGH (ref 70–99)
HCO3 BLDA-SCNC: 23 MMOL/L — SIGNIFICANT CHANGE UP (ref 21–28)
HCT VFR BLD CALC: 33.9 % — LOW (ref 39–50)
HCT VFR BLD CALC: 35.6 % — LOW (ref 39–50)
HCT VFR BLD CALC: 37.4 % — LOW (ref 39–50)
HGB BLD-MCNC: 11.3 G/DL — LOW (ref 13–17)
HGB BLD-MCNC: 11.6 G/DL — LOW (ref 13–17)
HGB BLD-MCNC: 12.4 G/DL — LOW (ref 13–17)
IMM GRANULOCYTES NFR BLD AUTO: 0.3 % — SIGNIFICANT CHANGE UP (ref 0–1.5)
IMM GRANULOCYTES NFR BLD AUTO: 0.5 % — SIGNIFICANT CHANGE UP (ref 0–1.5)
INR BLD: 1.16 — SIGNIFICANT CHANGE UP (ref 0.88–1.16)
INR BLD: 1.17 — HIGH (ref 0.88–1.16)
LACTATE SERPL-SCNC: 1.1 MMOL/L — SIGNIFICANT CHANGE UP (ref 0.5–2)
LACTATE SERPL-SCNC: 1.8 MMOL/L — SIGNIFICANT CHANGE UP (ref 0.5–2)
LACTATE SERPL-SCNC: 2.5 MMOL/L — HIGH (ref 0.5–2)
LYMPHOCYTES # BLD AUTO: 1.02 K/UL — SIGNIFICANT CHANGE UP (ref 1–3.3)
LYMPHOCYTES # BLD AUTO: 1.38 K/UL — SIGNIFICANT CHANGE UP (ref 1–3.3)
LYMPHOCYTES # BLD AUTO: 8.1 % — LOW (ref 13–44)
LYMPHOCYTES # BLD AUTO: 9.1 % — LOW (ref 13–44)
MAGNESIUM SERPL-MCNC: 1.6 MG/DL — SIGNIFICANT CHANGE UP (ref 1.6–2.6)
MCHC RBC-ENTMCNC: 30.6 PG — SIGNIFICANT CHANGE UP (ref 27–34)
MCHC RBC-ENTMCNC: 30.9 PG — SIGNIFICANT CHANGE UP (ref 27–34)
MCHC RBC-ENTMCNC: 31 PG — SIGNIFICANT CHANGE UP (ref 27–34)
MCHC RBC-ENTMCNC: 32.6 GM/DL — SIGNIFICANT CHANGE UP (ref 32–36)
MCHC RBC-ENTMCNC: 33.2 GM/DL — SIGNIFICANT CHANGE UP (ref 32–36)
MCHC RBC-ENTMCNC: 33.3 GM/DL — SIGNIFICANT CHANGE UP (ref 32–36)
MCV RBC AUTO: 92.3 FL — SIGNIFICANT CHANGE UP (ref 80–100)
MCV RBC AUTO: 92.9 FL — SIGNIFICANT CHANGE UP (ref 80–100)
MCV RBC AUTO: 94.7 FL — SIGNIFICANT CHANGE UP (ref 80–100)
MONOCYTES # BLD AUTO: 0.7 K/UL — SIGNIFICANT CHANGE UP (ref 0–0.9)
MONOCYTES # BLD AUTO: 0.79 K/UL — SIGNIFICANT CHANGE UP (ref 0–0.9)
MONOCYTES NFR BLD AUTO: 4.6 % — SIGNIFICANT CHANGE UP (ref 2–14)
MONOCYTES NFR BLD AUTO: 6.2 % — SIGNIFICANT CHANGE UP (ref 2–14)
NEUTROPHILS # BLD AUTO: 14.51 K/UL — HIGH (ref 1.8–7.4)
NEUTROPHILS # BLD AUTO: 9.43 K/UL — HIGH (ref 1.8–7.4)
NEUTROPHILS NFR BLD AUTO: 83.7 % — HIGH (ref 43–77)
NEUTROPHILS NFR BLD AUTO: 84.8 % — HIGH (ref 43–77)
NRBC # BLD: 0 /100 WBCS — SIGNIFICANT CHANGE UP (ref 0–0)
PCO2 BLDA: 31 MMHG — LOW (ref 35–48)
PH BLDA: 7.49 — HIGH (ref 7.35–7.45)
PHOSPHATE SERPL-MCNC: 3.6 MG/DL — SIGNIFICANT CHANGE UP (ref 2.5–4.5)
PLATELET # BLD AUTO: 142 K/UL — LOW (ref 150–400)
PLATELET # BLD AUTO: 165 K/UL — SIGNIFICANT CHANGE UP (ref 150–400)
PLATELET # BLD AUTO: 175 K/UL — SIGNIFICANT CHANGE UP (ref 150–400)
PO2 BLDA: 209 MMHG — HIGH (ref 83–108)
POTASSIUM SERPL-MCNC: 4 MMOL/L — SIGNIFICANT CHANGE UP (ref 3.5–5.3)
POTASSIUM SERPL-MCNC: 4 MMOL/L — SIGNIFICANT CHANGE UP (ref 3.5–5.3)
POTASSIUM SERPL-SCNC: 4 MMOL/L — SIGNIFICANT CHANGE UP (ref 3.5–5.3)
POTASSIUM SERPL-SCNC: 4 MMOL/L — SIGNIFICANT CHANGE UP (ref 3.5–5.3)
PROT SERPL-MCNC: 5.2 G/DL — LOW (ref 6–8.3)
PROT SERPL-MCNC: 5.9 G/DL — LOW (ref 6–8.3)
PROTHROM AB SERPL-ACNC: 13.2 SEC — HIGH (ref 10–12.9)
PROTHROM AB SERPL-ACNC: 13.3 SEC — HIGH (ref 10–12.9)
RBC # BLD: 3.65 M/UL — LOW (ref 4.2–5.8)
RBC # BLD: 3.76 M/UL — LOW (ref 4.2–5.8)
RBC # BLD: 4.05 M/UL — LOW (ref 4.2–5.8)
RBC # FLD: 12 % — SIGNIFICANT CHANGE UP (ref 10.3–14.5)
RBC # FLD: 12.2 % — SIGNIFICANT CHANGE UP (ref 10.3–14.5)
RBC # FLD: 12.2 % — SIGNIFICANT CHANGE UP (ref 10.3–14.5)
SAO2 % BLDA: 99 % — SIGNIFICANT CHANGE UP (ref 95–100)
SODIUM SERPL-SCNC: 139 MMOL/L — SIGNIFICANT CHANGE UP (ref 135–145)
SODIUM SERPL-SCNC: 141 MMOL/L — SIGNIFICANT CHANGE UP (ref 135–145)
WBC # BLD: 11.26 K/UL — HIGH (ref 3.8–10.5)
WBC # BLD: 17.1 K/UL — HIGH (ref 3.8–10.5)
WBC # BLD: 7.99 K/UL — SIGNIFICANT CHANGE UP (ref 3.8–10.5)
WBC # FLD AUTO: 11.26 K/UL — HIGH (ref 3.8–10.5)
WBC # FLD AUTO: 17.1 K/UL — HIGH (ref 3.8–10.5)
WBC # FLD AUTO: 7.99 K/UL — SIGNIFICANT CHANGE UP (ref 3.8–10.5)

## 2019-10-15 PROCEDURE — 93010 ELECTROCARDIOGRAM REPORT: CPT

## 2019-10-15 PROCEDURE — 33533 CABG ARTERIAL SINGLE: CPT | Mod: AS

## 2019-10-15 PROCEDURE — 71045 X-RAY EXAM CHEST 1 VIEW: CPT | Mod: 26,77

## 2019-10-15 PROCEDURE — 99291 CRITICAL CARE FIRST HOUR: CPT

## 2019-10-15 PROCEDURE — 71045 X-RAY EXAM CHEST 1 VIEW: CPT | Mod: 26

## 2019-10-15 PROCEDURE — 33533 CABG ARTERIAL SINGLE: CPT | Mod: 53

## 2019-10-15 PROCEDURE — 99292 CRITICAL CARE ADDL 30 MIN: CPT

## 2019-10-15 RX ORDER — ATORVASTATIN CALCIUM 80 MG/1
40 TABLET, FILM COATED ORAL AT BEDTIME
Refills: 0 | Status: DISCONTINUED | OUTPATIENT
Start: 2019-10-15 | End: 2019-10-22

## 2019-10-15 RX ORDER — FAMOTIDINE 10 MG/ML
20 INJECTION INTRAVENOUS DAILY
Refills: 0 | Status: DISCONTINUED | OUTPATIENT
Start: 2019-10-15 | End: 2019-10-16

## 2019-10-15 RX ORDER — SODIUM CHLORIDE 9 MG/ML
1000 INJECTION INTRAMUSCULAR; INTRAVENOUS; SUBCUTANEOUS
Refills: 0 | Status: DISCONTINUED | OUTPATIENT
Start: 2019-10-15 | End: 2019-10-16

## 2019-10-15 RX ORDER — CHLORHEXIDINE GLUCONATE 213 G/1000ML
1 SOLUTION TOPICAL ONCE
Refills: 0 | Status: COMPLETED | OUTPATIENT
Start: 2019-10-16 | End: 2019-10-16

## 2019-10-15 RX ORDER — CHLORHEXIDINE GLUCONATE 213 G/1000ML
15 SOLUTION TOPICAL EVERY 12 HOURS
Refills: 0 | Status: DISCONTINUED | OUTPATIENT
Start: 2019-10-15 | End: 2019-10-15

## 2019-10-15 RX ORDER — HYDROMORPHONE HYDROCHLORIDE 2 MG/ML
0.5 INJECTION INTRAMUSCULAR; INTRAVENOUS; SUBCUTANEOUS ONCE
Refills: 0 | Status: DISCONTINUED | OUTPATIENT
Start: 2019-10-15 | End: 2019-10-15

## 2019-10-15 RX ORDER — FENTANYL CITRATE 50 UG/ML
50 INJECTION INTRAVENOUS ONCE
Refills: 0 | Status: DISCONTINUED | OUTPATIENT
Start: 2019-10-15 | End: 2019-10-15

## 2019-10-15 RX ORDER — CHLORHEXIDINE GLUCONATE 213 G/1000ML
1 SOLUTION TOPICAL ONCE
Refills: 0 | Status: COMPLETED | OUTPATIENT
Start: 2019-10-15 | End: 2019-10-15

## 2019-10-15 RX ORDER — MILRINONE LACTATE 1 MG/ML
0.5 INJECTION, SOLUTION INTRAVENOUS
Qty: 20 | Refills: 0 | Status: DISCONTINUED | OUTPATIENT
Start: 2019-10-15 | End: 2019-10-16

## 2019-10-15 RX ORDER — NOREPINEPHRINE BITARTRATE/D5W 8 MG/250ML
0.03 PLASTIC BAG, INJECTION (ML) INTRAVENOUS
Qty: 8 | Refills: 0 | Status: DISCONTINUED | OUTPATIENT
Start: 2019-10-15 | End: 2019-10-16

## 2019-10-15 RX ORDER — CEFAZOLIN SODIUM 1 G
2000 VIAL (EA) INJECTION EVERY 8 HOURS
Refills: 0 | Status: DISCONTINUED | OUTPATIENT
Start: 2019-10-15 | End: 2019-10-15

## 2019-10-15 RX ORDER — PROPOFOL 10 MG/ML
5 INJECTION, EMULSION INTRAVENOUS
Qty: 1000 | Refills: 0 | Status: DISCONTINUED | OUTPATIENT
Start: 2019-10-15 | End: 2019-10-15

## 2019-10-15 RX ORDER — INSULIN LISPRO 100/ML
VIAL (ML) SUBCUTANEOUS
Refills: 0 | Status: DISCONTINUED | OUTPATIENT
Start: 2019-10-15 | End: 2019-10-16

## 2019-10-15 RX ORDER — FENTANYL CITRATE 50 UG/ML
25 INJECTION INTRAVENOUS ONCE
Refills: 0 | Status: DISCONTINUED | OUTPATIENT
Start: 2019-10-15 | End: 2019-10-15

## 2019-10-15 RX ORDER — DEXTROSE 50 % IN WATER 50 %
50 SYRINGE (ML) INTRAVENOUS
Refills: 0 | Status: DISCONTINUED | OUTPATIENT
Start: 2019-10-15 | End: 2019-10-16

## 2019-10-15 RX ORDER — DEXMEDETOMIDINE HYDROCHLORIDE IN 0.9% SODIUM CHLORIDE 4 UG/ML
0.2 INJECTION INTRAVENOUS
Qty: 200 | Refills: 0 | Status: DISCONTINUED | OUTPATIENT
Start: 2019-10-15 | End: 2019-10-15

## 2019-10-15 RX ORDER — ASPIRIN/CALCIUM CARB/MAGNESIUM 324 MG
81 TABLET ORAL DAILY
Refills: 0 | Status: DISCONTINUED | OUTPATIENT
Start: 2019-10-15 | End: 2019-10-16

## 2019-10-15 RX ORDER — DEXTROSE 50 % IN WATER 50 %
15 SYRINGE (ML) INTRAVENOUS ONCE
Refills: 0 | Status: DISCONTINUED | OUTPATIENT
Start: 2019-10-15 | End: 2019-10-16

## 2019-10-15 RX ORDER — CEFAZOLIN SODIUM 1 G
2000 VIAL (EA) INJECTION EVERY 8 HOURS
Refills: 0 | Status: DISCONTINUED | OUTPATIENT
Start: 2019-10-15 | End: 2019-10-16

## 2019-10-15 RX ORDER — DEXTROSE 50 % IN WATER 50 %
25 SYRINGE (ML) INTRAVENOUS
Refills: 0 | Status: DISCONTINUED | OUTPATIENT
Start: 2019-10-15 | End: 2019-10-16

## 2019-10-15 RX ORDER — CHLORHEXIDINE GLUCONATE 213 G/1000ML
5 SOLUTION TOPICAL ONCE
Refills: 0 | Status: COMPLETED | OUTPATIENT
Start: 2019-10-15 | End: 2019-10-16

## 2019-10-15 RX ORDER — CHLORHEXIDINE GLUCONATE 213 G/1000ML
1 SOLUTION TOPICAL DAILY
Refills: 0 | Status: DISCONTINUED | OUTPATIENT
Start: 2019-10-15 | End: 2019-10-16

## 2019-10-15 RX ORDER — MAGNESIUM SULFATE 500 MG/ML
2 VIAL (ML) INJECTION ONCE
Refills: 0 | Status: COMPLETED | OUTPATIENT
Start: 2019-10-15 | End: 2019-10-15

## 2019-10-15 RX ORDER — INSULIN HUMAN 100 [IU]/ML
2 INJECTION, SOLUTION SUBCUTANEOUS
Qty: 50 | Refills: 0 | Status: DISCONTINUED | OUTPATIENT
Start: 2019-10-15 | End: 2019-10-15

## 2019-10-15 RX ORDER — NICARDIPINE HYDROCHLORIDE 30 MG/1
5 CAPSULE, EXTENDED RELEASE ORAL
Qty: 40 | Refills: 0 | Status: DISCONTINUED | OUTPATIENT
Start: 2019-10-15 | End: 2019-10-16

## 2019-10-15 RX ORDER — ACETAMINOPHEN 500 MG
1000 TABLET ORAL ONCE
Refills: 0 | Status: COMPLETED | OUTPATIENT
Start: 2019-10-15 | End: 2019-10-15

## 2019-10-15 RX ORDER — POTASSIUM CHLORIDE 20 MEQ
20 PACKET (EA) ORAL ONCE
Refills: 0 | Status: COMPLETED | OUTPATIENT
Start: 2019-10-15 | End: 2019-10-15

## 2019-10-15 RX ORDER — HEPARIN SODIUM 5000 [USP'U]/ML
5000 INJECTION INTRAVENOUS; SUBCUTANEOUS EVERY 8 HOURS
Refills: 0 | Status: DISCONTINUED | OUTPATIENT
Start: 2019-10-15 | End: 2019-10-16

## 2019-10-15 RX ORDER — SODIUM CHLORIDE 9 MG/ML
1000 INJECTION, SOLUTION INTRAVENOUS
Refills: 0 | Status: DISCONTINUED | OUTPATIENT
Start: 2019-10-15 | End: 2019-10-16

## 2019-10-15 RX ORDER — ALBUMIN HUMAN 25 %
250 VIAL (ML) INTRAVENOUS
Refills: 0 | Status: COMPLETED | OUTPATIENT
Start: 2019-10-15 | End: 2019-10-15

## 2019-10-15 RX ORDER — GLUCAGON INJECTION, SOLUTION 0.5 MG/.1ML
1 INJECTION, SOLUTION SUBCUTANEOUS ONCE
Refills: 0 | Status: DISCONTINUED | OUTPATIENT
Start: 2019-10-15 | End: 2019-10-16

## 2019-10-15 RX ORDER — BUPIVACAINE 13.3 MG/ML
20 INJECTION, SUSPENSION, LIPOSOMAL INFILTRATION ONCE
Refills: 0 | Status: DISCONTINUED | OUTPATIENT
Start: 2019-10-15 | End: 2019-10-15

## 2019-10-15 RX ORDER — CHLORHEXIDINE GLUCONATE 213 G/1000ML
5 SOLUTION TOPICAL EVERY 4 HOURS
Refills: 0 | Status: DISCONTINUED | OUTPATIENT
Start: 2019-10-15 | End: 2019-10-15

## 2019-10-15 RX ADMIN — Medication 2000 MILLIGRAM(S): at 16:15

## 2019-10-15 RX ADMIN — HEPARIN SODIUM 5000 UNIT(S): 5000 INJECTION INTRAVENOUS; SUBCUTANEOUS at 21:56

## 2019-10-15 RX ADMIN — Medication 1000 MILLIGRAM(S): at 15:17

## 2019-10-15 RX ADMIN — Medication 125 MILLILITER(S): at 12:22

## 2019-10-15 RX ADMIN — CHLORHEXIDINE GLUCONATE 1 APPLICATION(S): 213 SOLUTION TOPICAL at 13:14

## 2019-10-15 RX ADMIN — FAMOTIDINE 20 MILLIGRAM(S): 10 INJECTION INTRAVENOUS at 13:15

## 2019-10-15 RX ADMIN — Medication 20 MILLIEQUIVALENT(S): at 21:55

## 2019-10-15 RX ADMIN — Medication 125 MILLILITER(S): at 11:33

## 2019-10-15 RX ADMIN — HYDROMORPHONE HYDROCHLORIDE 0.5 MILLIGRAM(S): 2 INJECTION INTRAMUSCULAR; INTRAVENOUS; SUBCUTANEOUS at 23:33

## 2019-10-15 RX ADMIN — CHLORHEXIDINE GLUCONATE 5 MILLILITER(S): 213 SOLUTION TOPICAL at 13:15

## 2019-10-15 RX ADMIN — ATORVASTATIN CALCIUM 40 MILLIGRAM(S): 80 TABLET, FILM COATED ORAL at 21:55

## 2019-10-15 RX ADMIN — Medication 400 MILLIGRAM(S): at 14:51

## 2019-10-15 RX ADMIN — Medication 2000 MILLIGRAM(S): at 23:34

## 2019-10-15 RX ADMIN — FENTANYL CITRATE 25 MICROGRAM(S): 50 INJECTION INTRAVENOUS at 18:45

## 2019-10-15 RX ADMIN — HYDROMORPHONE HYDROCHLORIDE 0.5 MILLIGRAM(S): 2 INJECTION INTRAMUSCULAR; INTRAVENOUS; SUBCUTANEOUS at 23:48

## 2019-10-15 RX ADMIN — FENTANYL CITRATE 25 MICROGRAM(S): 50 INJECTION INTRAVENOUS at 19:14

## 2019-10-15 RX ADMIN — Medication 11 MICROGRAM(S)/MIN: at 00:47

## 2019-10-15 RX ADMIN — HEPARIN SODIUM 5000 UNIT(S): 5000 INJECTION INTRAVENOUS; SUBCUTANEOUS at 14:36

## 2019-10-15 RX ADMIN — CHLORHEXIDINE GLUCONATE 1 APPLICATION(S): 213 SOLUTION TOPICAL at 05:36

## 2019-10-15 RX ADMIN — FENTANYL CITRATE 50 MICROGRAM(S): 50 INJECTION INTRAVENOUS at 19:14

## 2019-10-15 RX ADMIN — FENTANYL CITRATE 50 MICROGRAM(S): 50 INJECTION INTRAVENOUS at 12:15

## 2019-10-15 RX ADMIN — CHLORHEXIDINE GLUCONATE 5 MILLILITER(S): 213 SOLUTION TOPICAL at 05:35

## 2019-10-15 RX ADMIN — CHLORHEXIDINE GLUCONATE 1 APPLICATION(S): 213 SOLUTION TOPICAL at 22:48

## 2019-10-15 RX ADMIN — SODIUM CHLORIDE 3 MILLILITER(S): 9 INJECTION INTRAMUSCULAR; INTRAVENOUS; SUBCUTANEOUS at 05:35

## 2019-10-15 RX ADMIN — Medication 81 MILLIGRAM(S): at 21:55

## 2019-10-15 RX ADMIN — NICARDIPINE HYDROCHLORIDE 25 MG/HR: 30 CAPSULE, EXTENDED RELEASE ORAL at 23:40

## 2019-10-15 RX ADMIN — Medication 650 MILLIGRAM(S): at 01:52

## 2019-10-15 RX ADMIN — Medication 50 GRAM(S): at 16:15

## 2019-10-15 RX ADMIN — Medication 650 MILLIGRAM(S): at 00:52

## 2019-10-15 RX ADMIN — PANTOPRAZOLE SODIUM 40 MILLIGRAM(S): 20 TABLET, DELAYED RELEASE ORAL at 06:10

## 2019-10-15 NOTE — CHART NOTE - NSCHARTNOTEFT_GEN_A_CORE
Patient received beta blocker 6 pm last night (within 24 hours of surgery). Betablocker was held this morning secondary to beta blocker.

## 2019-10-15 NOTE — PROGRESS NOTE ADULT - SUBJECTIVE AND OBJECTIVE BOX
CTICU  CRITICAL  CARE  attending     Hand off received 					   Pertinent clinical, laboratory, radiographic, hemodynamic, echocardiographic, respiratory data, microbiologic data and chart were reviewed and analyzed frequently throughout the course of the day and night  Patient seen and examined with CTS/ SH attending at bedside  Pt is a 54y , Male, HEALTH ISSUES - PROBLEM Dx:  Type 1 diabetes mellitus without complication: Type 1 diabetes mellitus without complication  Hypertension, unspecified type: Hypertension, unspecified type  H/O non-ST elevation myocardial infarction (NSTEMI): H/O non-ST elevation myocardial infarction (NSTEMI)  Coronary artery disease involving native heart with unstable angina pectoris, unspecified vessel or lesion type: Coronary artery disease involving native heart with unstable angina pectoris, unspecified vessel or lesion type      , FAMILY HISTORY:  No pertinent family history in first degree relatives  PAST MEDICAL & SURGICAL HISTORY:  H/O unstable angina  DM (diabetes mellitus)  H/O non-ST elevation myocardial infarction (NSTEMI)  HTN (hypertension)  No significant past surgical history    Patient is a 54y old  Male who presents with a chief complaint of CAD (13 Oct 2019 16:05)      14 system review was unremarkable    Vital signs, hemodynamic and respiratory parameters were reviewed from the bedside nursing flowsheet.  ICU Vital Signs Last 24 Hrs  T(C): 36.9 (15 Oct 2019 21:42), Max: 37 (15 Oct 2019 14:00)  T(F): 98.5 (15 Oct 2019 21:42), Max: 98.6 (15 Oct 2019 14:00)  HR: 90 (15 Oct 2019 23:00) (48 - 90)  BP: 153/84 (15 Oct 2019 05:26) (152/77 - 153/84)  BP(mean): 107 (15 Oct 2019 05:26) (107 - 107)  ABP: 158/60 (15 Oct 2019 23:00) (108/44 - 178/68)  ABP(mean): 88 (15 Oct 2019 23:00) (60 - 114)  RR: 16 (15 Oct 2019 23:00) (12 - 20)  SpO2: 98% (15 Oct 2019 23:00) (96% - 100%)    Adult Advanced Hemodynamics Last 24 Hrs  CVP(mm Hg): 3 (15 Oct 2019 23:00) (0 - 26)  CVP(cm H2O): --  CO: --  CI: --  PA: --  PA(mean): --  PCWP: --  SVR: --  SVRI: --  PVR: --  PVRI: --, ABG - ( 15 Oct 2019 15:14 )  pH, Arterial: 7.42  pH, Blood: x     /  pCO2: 40    /  pO2: 133   / HCO3: 26    / Base Excess: 1.2   /  SaO2: 99                Mode: AC/ CMV (Assist Control/ Continuous Mandatory Ventilation)  RR (machine): 10  TV (machine): 800  FiO2: 100  PEEP: 5  ITime: 1  PIP: 24    Intake and output was reviewed and the fluid balance was calculated  Daily     Daily   I&O's Summary    14 Oct 2019 07:01  -  15 Oct 2019 07:00  --------------------------------------------------------  IN: 732 mL / OUT: 1450 mL / NET: -718 mL    15 Oct 2019 07:01  -  15 Oct 2019 23:45  --------------------------------------------------------  IN: 1261.5 mL / OUT: 3635 mL / NET: -2373.5 mL        All lines and drain sites were assessed  Glycemic trend was reviewedCAPILLARY BLOOD GLUCOSE      POCT Blood Glucose.: 125 mg/dL (15 Oct 2019 21:52)    No acute change in mental status  Auscultation of the chest reveals equal bs  Abdomen is soft  Extremities are warm and well perfused  Wounds appear clean and unremarkable  Antibiotics are periop    labs  CBC Full  -  ( 15 Oct 2019 15:21 )  WBC Count : 11.26 K/uL  RBC Count : 3.65 M/uL  Hemoglobin : 11.3 g/dL  Hematocrit : 33.9 %  Platelet Count - Automated : 142 K/uL  Mean Cell Volume : 92.9 fl  Mean Cell Hemoglobin : 31.0 pg  Mean Cell Hemoglobin Concentration : 33.3 gm/dL  Auto Neutrophil # : 9.43 K/uL  Auto Lymphocyte # : 1.02 K/uL  Auto Monocyte # : 0.70 K/uL  Auto Eosinophil # : 0.06 K/uL  Auto Basophil # : 0.02 K/uL  Auto Neutrophil % : 83.7 %  Auto Lymphocyte % : 9.1 %  Auto Monocyte % : 6.2 %  Auto Eosinophil % : 0.5 %  Auto Basophil % : 0.2 %    10-15    141  |  106  |  19  ----------------------------<  148<H>  4.0   |  26  |  1.01    Ca    8.6      15 Oct 2019 15:25  Phos  3.6     10-15  Mg     1.6     10-15    TPro  5.9<L>  /  Alb  4.0  /  TBili  0.5  /  DBili  x   /  AST  61<H>  /  ALT  75<H>  /  AlkPhos  76  10-15    PT/INR - ( 15 Oct 2019 15:39 )   PT: 13.2 sec;   INR: 1.16          PTT - ( 15 Oct 2019 15:39 )  PTT:27.3 sec  The current medications were reviewed   MEDICATIONS  (STANDING):  aspirin enteric coated 81 milliGRAM(s) Oral daily  atorvastatin 40 milliGRAM(s) Oral at bedtime  ceFAZolin  Injectable. 2000 milliGRAM(s) IV Push every 8 hours  chlorhexidine 0.12% Liquid 5 milliLiter(s) Swish and Spit once  chlorhexidine 2% Cloths 1 Application(s) Topical daily  dextrose 5%. 1000 milliLiter(s) (50 mL/Hr) IV Continuous <Continuous>  dextrose 50% Injectable 50 milliLiter(s) IV Push every 15 minutes  dextrose 50% Injectable 25 milliLiter(s) IV Push every 15 minutes  famotidine Injectable 20 milliGRAM(s) IV Push daily  heparin  Injectable 5000 Unit(s) SubCutaneous every 8 hours  insulin lispro (HumaLOG) corrective regimen sliding scale   SubCutaneous Before meals and at bedtime  milrinone Infusion 0.5 MICROgram(s)/kG/Min (12.75 mL/Hr) IV Continuous <Continuous>  niCARdipine Infusion 5 mG/Hr (25 mL/Hr) IV Continuous <Continuous>  norepinephrine Infusion 0.03 MICROgram(s)/kG/Min (4.781 mL/Hr) IV Continuous <Continuous>  sodium chloride 0.9%. 1000 milliLiter(s) (10 mL/Hr) IV Continuous <Continuous>    MEDICATIONS  (PRN):  dextrose 40% Gel 15 Gram(s) Oral once PRN Blood Glucose LESS THAN 70 milliGRAM(s)/deciliter  glucagon  Injectable 1 milliGRAM(s) IntraMuscular once PRN Glucose LESS THAN 70 milligrams/deciliter       PROBLEM LIST/ ASSESSMENT:  HEALTH ISSUES - PROBLEM Dx:  Type 1 diabetes mellitus without complication: Type 1 diabetes mellitus without complication  Hypertension, unspecified type: Hypertension, unspecified type  H/O non-ST elevation myocardial infarction (NSTEMI): H/O non-ST elevation myocardial infarction (NSTEMI)  Coronary artery disease involving native heart with unstable angina pectoris, unspecified vessel or lesion type: Coronary artery disease involving native heart with unstable angina pectoris, unspecified vessel or lesion type      ,   Patient is a 54y old  Male who presents with a chief complaint of CAD (13 Oct 2019 16:05)     s/p cardiac surgery          My plan includes :  close hemodynamic, ventilatory and drain monitoring and management per post op routine    Monitor for arrhythmias and monitor parameters for organ perfusion  beta blockade not administered due to hemodynamic instability and bradycardia  monitor neurologic status  Head of the bed should remain elevated to 45 deg .   chest PT and IS will be encouraged  monitor adequacy of oxygenation and ventilation and attempt to wean oxygen  antibiotic regimen will be tailored to the clinical, laboratory and microbiologic data  Nutritional goals will be met using po eventually , ensure adequate caloric intake and montior the same  Stress ulcer and VTE prophylaxis will be achieved    Glycemic control is satisfactory  Electrolytes have been repleted as necessary and wound care has been carried out. Pain control has been achieved.   agressive physical therapy and early mobility and ambulation goals will be met   The family was updated about the course and plan  CRITICAL CARE TIME personally provided by me  in evaluation and management, reassessments, review and interpretation of labs and x-rays, ventilator and hemodynamic management, formulating a plan and coordinating care: ___90____ MIN.  Time does not include procedural time.  CTICU ATTENDING     					    Ferdinand Still MD

## 2019-10-15 NOTE — BRIEF OPERATIVE NOTE - COMMENTS
Patient went into V-tach during take down of left lung adhesions with monopolar cautery. Patient was shocked multiple times with chest compression in between to maintain CO. First few shocks were with left lung down while instrument were removed from chest. total event lasted about 5 minutes.    Patient arrived to CTICU intubated, on Primacor 0.5 and Levo 0.03

## 2019-10-16 ENCOUNTER — APPOINTMENT (OUTPATIENT)
Dept: CARDIOTHORACIC SURGERY | Facility: HOSPITAL | Age: 54
End: 2019-10-16

## 2019-10-16 LAB
ALBUMIN SERPL ELPH-MCNC: 3.4 G/DL — SIGNIFICANT CHANGE UP (ref 3.3–5)
ALBUMIN SERPL ELPH-MCNC: 3.6 G/DL — SIGNIFICANT CHANGE UP (ref 3.3–5)
ALBUMIN SERPL ELPH-MCNC: 4 G/DL — SIGNIFICANT CHANGE UP (ref 3.3–5)
ALP SERPL-CCNC: 77 U/L — SIGNIFICANT CHANGE UP (ref 40–120)
ALP SERPL-CCNC: 83 U/L — SIGNIFICANT CHANGE UP (ref 40–120)
ALP SERPL-CCNC: 85 U/L — SIGNIFICANT CHANGE UP (ref 40–120)
ALT FLD-CCNC: 48 U/L — HIGH (ref 10–45)
ALT FLD-CCNC: 52 U/L — HIGH (ref 10–45)
ALT FLD-CCNC: 73 U/L — HIGH (ref 10–45)
ANION GAP SERPL CALC-SCNC: 10 MMOL/L — SIGNIFICANT CHANGE UP (ref 5–17)
ANION GAP SERPL CALC-SCNC: 11 MMOL/L — SIGNIFICANT CHANGE UP (ref 5–17)
ANION GAP SERPL CALC-SCNC: 9 MMOL/L — SIGNIFICANT CHANGE UP (ref 5–17)
APTT BLD: 30.7 SEC — SIGNIFICANT CHANGE UP (ref 27.5–36.3)
APTT BLD: 31.6 SEC — SIGNIFICANT CHANGE UP (ref 27.5–36.3)
APTT BLD: 36.8 SEC — HIGH (ref 27.5–36.3)
AST SERPL-CCNC: 37 U/L — SIGNIFICANT CHANGE UP (ref 10–40)
AST SERPL-CCNC: 38 U/L — SIGNIFICANT CHANGE UP (ref 10–40)
AST SERPL-CCNC: 66 U/L — HIGH (ref 10–40)
BASOPHILS # BLD AUTO: 0.04 K/UL — SIGNIFICANT CHANGE UP (ref 0–0.2)
BASOPHILS NFR BLD AUTO: 0.4 % — SIGNIFICANT CHANGE UP (ref 0–2)
BILIRUB SERPL-MCNC: 0.4 MG/DL — SIGNIFICANT CHANGE UP (ref 0.2–1.2)
BILIRUB SERPL-MCNC: 0.7 MG/DL — SIGNIFICANT CHANGE UP (ref 0.2–1.2)
BILIRUB SERPL-MCNC: 0.9 MG/DL — SIGNIFICANT CHANGE UP (ref 0.2–1.2)
BUN SERPL-MCNC: 14 MG/DL — SIGNIFICANT CHANGE UP (ref 7–23)
BUN SERPL-MCNC: 14 MG/DL — SIGNIFICANT CHANGE UP (ref 7–23)
BUN SERPL-MCNC: 15 MG/DL — SIGNIFICANT CHANGE UP (ref 7–23)
CALCIUM SERPL-MCNC: 8.6 MG/DL — SIGNIFICANT CHANGE UP (ref 8.4–10.5)
CALCIUM SERPL-MCNC: 8.6 MG/DL — SIGNIFICANT CHANGE UP (ref 8.4–10.5)
CALCIUM SERPL-MCNC: 9 MG/DL — SIGNIFICANT CHANGE UP (ref 8.4–10.5)
CHLORIDE SERPL-SCNC: 103 MMOL/L — SIGNIFICANT CHANGE UP (ref 96–108)
CHLORIDE SERPL-SCNC: 104 MMOL/L — SIGNIFICANT CHANGE UP (ref 96–108)
CHLORIDE SERPL-SCNC: 105 MMOL/L — SIGNIFICANT CHANGE UP (ref 96–108)
CO2 SERPL-SCNC: 24 MMOL/L — SIGNIFICANT CHANGE UP (ref 22–31)
CO2 SERPL-SCNC: 25 MMOL/L — SIGNIFICANT CHANGE UP (ref 22–31)
CO2 SERPL-SCNC: 27 MMOL/L — SIGNIFICANT CHANGE UP (ref 22–31)
CREAT SERPL-MCNC: 0.75 MG/DL — SIGNIFICANT CHANGE UP (ref 0.5–1.3)
CREAT SERPL-MCNC: 0.81 MG/DL — SIGNIFICANT CHANGE UP (ref 0.5–1.3)
CREAT SERPL-MCNC: 0.99 MG/DL — SIGNIFICANT CHANGE UP (ref 0.5–1.3)
EOSINOPHIL # BLD AUTO: 0.05 K/UL — SIGNIFICANT CHANGE UP (ref 0–0.5)
EOSINOPHIL NFR BLD AUTO: 0.4 % — SIGNIFICANT CHANGE UP (ref 0–6)
GAS PNL BLDA: SIGNIFICANT CHANGE UP
GLUCOSE BLDC GLUCOMTR-MCNC: 120 MG/DL — HIGH (ref 70–99)
GLUCOSE BLDC GLUCOMTR-MCNC: 134 MG/DL — HIGH (ref 70–99)
GLUCOSE BLDC GLUCOMTR-MCNC: 148 MG/DL — HIGH (ref 70–99)
GLUCOSE SERPL-MCNC: 130 MG/DL — HIGH (ref 70–99)
GLUCOSE SERPL-MCNC: 182 MG/DL — HIGH (ref 70–99)
GLUCOSE SERPL-MCNC: 195 MG/DL — HIGH (ref 70–99)
HCT VFR BLD CALC: 34.8 % — LOW (ref 39–50)
HCT VFR BLD CALC: 38.8 % — LOW (ref 39–50)
HCT VFR BLD CALC: 40 % — SIGNIFICANT CHANGE UP (ref 39–50)
HGB BLD-MCNC: 11.5 G/DL — LOW (ref 13–17)
HGB BLD-MCNC: 12.4 G/DL — LOW (ref 13–17)
HGB BLD-MCNC: 13 G/DL — SIGNIFICANT CHANGE UP (ref 13–17)
IMM GRANULOCYTES NFR BLD AUTO: 0.5 % — SIGNIFICANT CHANGE UP (ref 0–1.5)
INR BLD: 1.21 — HIGH (ref 0.88–1.16)
INR BLD: 1.25 — HIGH (ref 0.88–1.16)
INR BLD: 1.41 — HIGH (ref 0.88–1.16)
LACTATE SERPL-SCNC: 1.2 MMOL/L — SIGNIFICANT CHANGE UP (ref 0.5–2)
LACTATE SERPL-SCNC: 1.8 MMOL/L — SIGNIFICANT CHANGE UP (ref 0.5–2)
LACTATE SERPL-SCNC: 2.4 MMOL/L — HIGH (ref 0.5–2)
LYMPHOCYTES # BLD AUTO: 0.91 K/UL — LOW (ref 1–3.3)
LYMPHOCYTES # BLD AUTO: 8.1 % — LOW (ref 13–44)
MAGNESIUM SERPL-MCNC: 1.6 MG/DL — SIGNIFICANT CHANGE UP (ref 1.6–2.6)
MAGNESIUM SERPL-MCNC: 1.8 MG/DL — SIGNIFICANT CHANGE UP (ref 1.6–2.6)
MAGNESIUM SERPL-MCNC: 2.1 MG/DL — SIGNIFICANT CHANGE UP (ref 1.6–2.6)
MCHC RBC-ENTMCNC: 30.2 PG — SIGNIFICANT CHANGE UP (ref 27–34)
MCHC RBC-ENTMCNC: 30.2 PG — SIGNIFICANT CHANGE UP (ref 27–34)
MCHC RBC-ENTMCNC: 30.4 PG — SIGNIFICANT CHANGE UP (ref 27–34)
MCHC RBC-ENTMCNC: 32 GM/DL — SIGNIFICANT CHANGE UP (ref 32–36)
MCHC RBC-ENTMCNC: 32.5 GM/DL — SIGNIFICANT CHANGE UP (ref 32–36)
MCHC RBC-ENTMCNC: 33 GM/DL — SIGNIFICANT CHANGE UP (ref 32–36)
MCV RBC AUTO: 92.1 FL — SIGNIFICANT CHANGE UP (ref 80–100)
MCV RBC AUTO: 93 FL — SIGNIFICANT CHANGE UP (ref 80–100)
MCV RBC AUTO: 94.6 FL — SIGNIFICANT CHANGE UP (ref 80–100)
MONOCYTES # BLD AUTO: 0.68 K/UL — SIGNIFICANT CHANGE UP (ref 0–0.9)
MONOCYTES NFR BLD AUTO: 6 % — SIGNIFICANT CHANGE UP (ref 2–14)
NEUTROPHILS # BLD AUTO: 9.51 K/UL — HIGH (ref 1.8–7.4)
NEUTROPHILS NFR BLD AUTO: 84.6 % — HIGH (ref 43–77)
NRBC # BLD: 0 /100 WBCS — SIGNIFICANT CHANGE UP (ref 0–0)
PHOSPHATE SERPL-MCNC: 3.2 MG/DL — SIGNIFICANT CHANGE UP (ref 2.5–4.5)
PHOSPHATE SERPL-MCNC: 3.7 MG/DL — SIGNIFICANT CHANGE UP (ref 2.5–4.5)
PHOSPHATE SERPL-MCNC: 5.2 MG/DL — HIGH (ref 2.5–4.5)
PLATELET # BLD AUTO: 149 K/UL — LOW (ref 150–400)
PLATELET # BLD AUTO: 150 K/UL — SIGNIFICANT CHANGE UP (ref 150–400)
PLATELET # BLD AUTO: 182 K/UL — SIGNIFICANT CHANGE UP (ref 150–400)
POTASSIUM SERPL-MCNC: 4.2 MMOL/L — SIGNIFICANT CHANGE UP (ref 3.5–5.3)
POTASSIUM SERPL-MCNC: 4.2 MMOL/L — SIGNIFICANT CHANGE UP (ref 3.5–5.3)
POTASSIUM SERPL-MCNC: 4.5 MMOL/L — SIGNIFICANT CHANGE UP (ref 3.5–5.3)
POTASSIUM SERPL-SCNC: 4.2 MMOL/L — SIGNIFICANT CHANGE UP (ref 3.5–5.3)
POTASSIUM SERPL-SCNC: 4.2 MMOL/L — SIGNIFICANT CHANGE UP (ref 3.5–5.3)
POTASSIUM SERPL-SCNC: 4.5 MMOL/L — SIGNIFICANT CHANGE UP (ref 3.5–5.3)
PROT SERPL-MCNC: 5.3 G/DL — LOW (ref 6–8.3)
PROT SERPL-MCNC: 6.1 G/DL — SIGNIFICANT CHANGE UP (ref 6–8.3)
PROT SERPL-MCNC: 6.3 G/DL — SIGNIFICANT CHANGE UP (ref 6–8.3)
PROTHROM AB SERPL-ACNC: 13.7 SEC — HIGH (ref 10–12.9)
PROTHROM AB SERPL-ACNC: 14.2 SEC — HIGH (ref 10–12.9)
PROTHROM AB SERPL-ACNC: 16.1 SEC — HIGH (ref 10–12.9)
RBC # BLD: 3.78 M/UL — LOW (ref 4.2–5.8)
RBC # BLD: 4.1 M/UL — LOW (ref 4.2–5.8)
RBC # BLD: 4.3 M/UL — SIGNIFICANT CHANGE UP (ref 4.2–5.8)
RBC # FLD: 12 % — SIGNIFICANT CHANGE UP (ref 10.3–14.5)
RBC # FLD: 12.1 % — SIGNIFICANT CHANGE UP (ref 10.3–14.5)
RBC # FLD: 12.2 % — SIGNIFICANT CHANGE UP (ref 10.3–14.5)
SODIUM SERPL-SCNC: 138 MMOL/L — SIGNIFICANT CHANGE UP (ref 135–145)
SODIUM SERPL-SCNC: 139 MMOL/L — SIGNIFICANT CHANGE UP (ref 135–145)
SODIUM SERPL-SCNC: 141 MMOL/L — SIGNIFICANT CHANGE UP (ref 135–145)
WBC # BLD: 11.25 K/UL — HIGH (ref 3.8–10.5)
WBC # BLD: 11.31 K/UL — HIGH (ref 3.8–10.5)
WBC # BLD: 12.44 K/UL — HIGH (ref 3.8–10.5)
WBC # FLD AUTO: 11.25 K/UL — HIGH (ref 3.8–10.5)
WBC # FLD AUTO: 11.31 K/UL — HIGH (ref 3.8–10.5)
WBC # FLD AUTO: 12.44 K/UL — HIGH (ref 3.8–10.5)

## 2019-10-16 PROCEDURE — 71045 X-RAY EXAM CHEST 1 VIEW: CPT | Mod: 26

## 2019-10-16 PROCEDURE — 99292 CRITICAL CARE ADDL 30 MIN: CPT

## 2019-10-16 PROCEDURE — 33533 CABG ARTERIAL SINGLE: CPT | Mod: 58

## 2019-10-16 PROCEDURE — 33533 CABG ARTERIAL SINGLE: CPT | Mod: AS

## 2019-10-16 PROCEDURE — 93010 ELECTROCARDIOGRAM REPORT: CPT

## 2019-10-16 PROCEDURE — 33517 CABG ARTERY-VEIN SINGLE: CPT | Mod: AS

## 2019-10-16 PROCEDURE — 33508 ENDOSCOPIC VEIN HARVEST: CPT | Mod: AS

## 2019-10-16 PROCEDURE — 71045 X-RAY EXAM CHEST 1 VIEW: CPT | Mod: 26,77

## 2019-10-16 PROCEDURE — 76998 US GUIDE INTRAOP: CPT | Mod: 26,59

## 2019-10-16 PROCEDURE — 99291 CRITICAL CARE FIRST HOUR: CPT

## 2019-10-16 PROCEDURE — 33517 CABG ARTERY-VEIN SINGLE: CPT

## 2019-10-16 RX ORDER — ACETAMINOPHEN 500 MG
1000 TABLET ORAL ONCE
Refills: 0 | Status: COMPLETED | OUTPATIENT
Start: 2019-10-16 | End: 2019-10-17

## 2019-10-16 RX ORDER — DEXTROSE 50 % IN WATER 50 %
50 SYRINGE (ML) INTRAVENOUS
Refills: 0 | Status: DISCONTINUED | OUTPATIENT
Start: 2019-10-16 | End: 2019-10-18

## 2019-10-16 RX ORDER — CEFAZOLIN SODIUM 1 G
2000 VIAL (EA) INJECTION EVERY 8 HOURS
Refills: 0 | Status: DISCONTINUED | OUTPATIENT
Start: 2019-10-16 | End: 2019-10-16

## 2019-10-16 RX ORDER — DEXMEDETOMIDINE HYDROCHLORIDE IN 0.9% SODIUM CHLORIDE 4 UG/ML
0.2 INJECTION INTRAVENOUS
Qty: 200 | Refills: 0 | Status: DISCONTINUED | OUTPATIENT
Start: 2019-10-16 | End: 2019-10-17

## 2019-10-16 RX ORDER — CEFAZOLIN SODIUM 1 G
2000 VIAL (EA) INJECTION EVERY 8 HOURS
Refills: 0 | Status: COMPLETED | OUTPATIENT
Start: 2019-10-16 | End: 2019-10-18

## 2019-10-16 RX ORDER — DEXTROSE 50 % IN WATER 50 %
25 SYRINGE (ML) INTRAVENOUS
Refills: 0 | Status: DISCONTINUED | OUTPATIENT
Start: 2019-10-16 | End: 2019-10-18

## 2019-10-16 RX ORDER — SODIUM CHLORIDE 9 MG/ML
1000 INJECTION INTRAMUSCULAR; INTRAVENOUS; SUBCUTANEOUS
Refills: 0 | Status: DISCONTINUED | OUTPATIENT
Start: 2019-10-16 | End: 2019-10-18

## 2019-10-16 RX ORDER — NICARDIPINE HYDROCHLORIDE 30 MG/1
5 CAPSULE, EXTENDED RELEASE ORAL
Qty: 40 | Refills: 0 | Status: DISCONTINUED | OUTPATIENT
Start: 2019-10-16 | End: 2019-10-17

## 2019-10-16 RX ORDER — PANTOPRAZOLE SODIUM 20 MG/1
40 TABLET, DELAYED RELEASE ORAL DAILY
Refills: 0 | Status: DISCONTINUED | OUTPATIENT
Start: 2019-10-16 | End: 2019-10-17

## 2019-10-16 RX ORDER — INSULIN HUMAN 100 [IU]/ML
1 INJECTION, SOLUTION SUBCUTANEOUS
Qty: 50 | Refills: 0 | Status: DISCONTINUED | OUTPATIENT
Start: 2019-10-16 | End: 2019-10-17

## 2019-10-16 RX ORDER — CALCIUM GLUCONATE 100 MG/ML
2 VIAL (ML) INTRAVENOUS ONCE
Refills: 0 | Status: COMPLETED | OUTPATIENT
Start: 2019-10-16 | End: 2019-10-16

## 2019-10-16 RX ORDER — CLOPIDOGREL BISULFATE 75 MG/1
75 TABLET, FILM COATED ORAL DAILY
Refills: 0 | Status: DISCONTINUED | OUTPATIENT
Start: 2019-10-16 | End: 2019-10-22

## 2019-10-16 RX ORDER — MAGNESIUM SULFATE 500 MG/ML
2 VIAL (ML) INJECTION ONCE
Refills: 0 | Status: COMPLETED | OUTPATIENT
Start: 2019-10-16 | End: 2019-10-16

## 2019-10-16 RX ORDER — CHLORHEXIDINE GLUCONATE 213 G/1000ML
5 SOLUTION TOPICAL EVERY 4 HOURS
Refills: 0 | Status: DISCONTINUED | OUTPATIENT
Start: 2019-10-16 | End: 2019-10-17

## 2019-10-16 RX ORDER — FENTANYL CITRATE 50 UG/ML
50 INJECTION INTRAVENOUS ONCE
Refills: 0 | Status: DISCONTINUED | OUTPATIENT
Start: 2019-10-16 | End: 2019-10-16

## 2019-10-16 RX ORDER — HEPARIN SODIUM 5000 [USP'U]/ML
5000 INJECTION INTRAVENOUS; SUBCUTANEOUS EVERY 8 HOURS
Refills: 0 | Status: DISCONTINUED | OUTPATIENT
Start: 2019-10-16 | End: 2019-10-22

## 2019-10-16 RX ORDER — ASPIRIN/CALCIUM CARB/MAGNESIUM 324 MG
81 TABLET ORAL DAILY
Refills: 0 | Status: DISCONTINUED | OUTPATIENT
Start: 2019-10-16 | End: 2019-10-22

## 2019-10-16 RX ORDER — HYDROMORPHONE HYDROCHLORIDE 2 MG/ML
0.5 INJECTION INTRAMUSCULAR; INTRAVENOUS; SUBCUTANEOUS ONCE
Refills: 0 | Status: DISCONTINUED | OUTPATIENT
Start: 2019-10-16 | End: 2019-10-16

## 2019-10-16 RX ORDER — CHLORHEXIDINE GLUCONATE 213 G/1000ML
1 SOLUTION TOPICAL DAILY
Refills: 0 | Status: DISCONTINUED | OUTPATIENT
Start: 2019-10-16 | End: 2019-10-18

## 2019-10-16 RX ORDER — CHLORHEXIDINE GLUCONATE 213 G/1000ML
15 SOLUTION TOPICAL EVERY 12 HOURS
Refills: 0 | Status: DISCONTINUED | OUTPATIENT
Start: 2019-10-16 | End: 2019-10-17

## 2019-10-16 RX ORDER — FENTANYL CITRATE 50 UG/ML
25 INJECTION INTRAVENOUS
Refills: 0 | Status: DISCONTINUED | OUTPATIENT
Start: 2019-10-16 | End: 2019-10-17

## 2019-10-16 RX ORDER — PROPOFOL 10 MG/ML
10 INJECTION, EMULSION INTRAVENOUS
Qty: 1000 | Refills: 0 | Status: DISCONTINUED | OUTPATIENT
Start: 2019-10-16 | End: 2019-10-17

## 2019-10-16 RX ADMIN — NICARDIPINE HYDROCHLORIDE 25 MG/HR: 30 CAPSULE, EXTENDED RELEASE ORAL at 20:02

## 2019-10-16 RX ADMIN — HYDROMORPHONE HYDROCHLORIDE 0.5 MILLIGRAM(S): 2 INJECTION INTRAMUSCULAR; INTRAVENOUS; SUBCUTANEOUS at 04:15

## 2019-10-16 RX ADMIN — Medication 50 GRAM(S): at 20:01

## 2019-10-16 RX ADMIN — MILRINONE LACTATE 12.75 MICROGRAM(S)/KG/MIN: 1 INJECTION, SOLUTION INTRAVENOUS at 06:20

## 2019-10-16 RX ADMIN — Medication 2000 MILLIGRAM(S): at 08:07

## 2019-10-16 RX ADMIN — Medication 200 GRAM(S): at 07:49

## 2019-10-16 RX ADMIN — CHLORHEXIDINE GLUCONATE 15 MILLILITER(S): 213 SOLUTION TOPICAL at 22:57

## 2019-10-16 RX ADMIN — HYDROMORPHONE HYDROCHLORIDE 0.5 MILLIGRAM(S): 2 INJECTION INTRAMUSCULAR; INTRAVENOUS; SUBCUTANEOUS at 08:30

## 2019-10-16 RX ADMIN — FENTANYL CITRATE 50 MICROGRAM(S): 50 INJECTION INTRAVENOUS at 19:12

## 2019-10-16 RX ADMIN — NICARDIPINE HYDROCHLORIDE 25 MG/HR: 30 CAPSULE, EXTENDED RELEASE ORAL at 06:20

## 2019-10-16 RX ADMIN — FAMOTIDINE 20 MILLIGRAM(S): 10 INJECTION INTRAVENOUS at 11:08

## 2019-10-16 RX ADMIN — HYDROMORPHONE HYDROCHLORIDE 0.5 MILLIGRAM(S): 2 INJECTION INTRAMUSCULAR; INTRAVENOUS; SUBCUTANEOUS at 08:15

## 2019-10-16 RX ADMIN — HEPARIN SODIUM 5000 UNIT(S): 5000 INJECTION INTRAVENOUS; SUBCUTANEOUS at 06:23

## 2019-10-16 RX ADMIN — CHLORHEXIDINE GLUCONATE 5 MILLILITER(S): 213 SOLUTION TOPICAL at 22:57

## 2019-10-16 RX ADMIN — PROPOFOL 5.1 MICROGRAM(S)/KG/MIN: 10 INJECTION, EMULSION INTRAVENOUS at 20:01

## 2019-10-16 RX ADMIN — CHLORHEXIDINE GLUCONATE 5 MILLILITER(S): 213 SOLUTION TOPICAL at 11:08

## 2019-10-16 RX ADMIN — NICARDIPINE HYDROCHLORIDE 25 MG/HR: 30 CAPSULE, EXTENDED RELEASE ORAL at 21:35

## 2019-10-16 RX ADMIN — Medication 200 GRAM(S): at 23:46

## 2019-10-16 RX ADMIN — Medication 2000 MILLIGRAM(S): at 22:57

## 2019-10-16 RX ADMIN — Medication 81 MILLIGRAM(S): at 11:08

## 2019-10-16 RX ADMIN — HYDROMORPHONE HYDROCHLORIDE 0.5 MILLIGRAM(S): 2 INJECTION INTRAMUSCULAR; INTRAVENOUS; SUBCUTANEOUS at 04:00

## 2019-10-16 RX ADMIN — CHLORHEXIDINE GLUCONATE 1 APPLICATION(S): 213 SOLUTION TOPICAL at 05:22

## 2019-10-16 RX ADMIN — NICARDIPINE HYDROCHLORIDE 25 MG/HR: 30 CAPSULE, EXTENDED RELEASE ORAL at 11:05

## 2019-10-16 RX ADMIN — HYDROMORPHONE HYDROCHLORIDE 0.5 MILLIGRAM(S): 2 INJECTION INTRAMUSCULAR; INTRAVENOUS; SUBCUTANEOUS at 22:16

## 2019-10-16 RX ADMIN — FENTANYL CITRATE 50 MICROGRAM(S): 50 INJECTION INTRAVENOUS at 19:27

## 2019-10-16 RX ADMIN — DEXMEDETOMIDINE HYDROCHLORIDE IN 0.9% SODIUM CHLORIDE 4.25 MICROGRAM(S)/KG/HR: 4 INJECTION INTRAVENOUS at 22:18

## 2019-10-16 RX ADMIN — Medication 50 GRAM(S): at 06:19

## 2019-10-16 RX ADMIN — HYDROMORPHONE HYDROCHLORIDE 0.5 MILLIGRAM(S): 2 INJECTION INTRAMUSCULAR; INTRAVENOUS; SUBCUTANEOUS at 22:30

## 2019-10-16 NOTE — DIETITIAN INITIAL EVALUATION ADULT. - OTHER INFO
54M current cocaine and marijuana user [facility] CAD (s/p PCI 9/19), DM, HTN, HLD who presented originally to [facility] ED a few weeks ago with retrosternal chest pain where he ruled in for NSTEMI and states he received " two stents" and was supposed to follow up for sx but had insurance issues, later developing 8/10 CP, admitted to [facility] for MIDCAB eval. Now POD1 CABG, observed resting in bed asking for breakfast. Notes hunger as he has not eaten for 15hrs per pt. Denies n/v/d/c, chewing/ swallowing issues or pain, skin with surgical incision. NKFA or changes in wt reported per pt. Typically follows regular diet at home. Discussed DASH/TLC diet to best meet needs. Will continue to follow per protocol.

## 2019-10-16 NOTE — DIETITIAN INITIAL EVALUATION ADULT. - ENERGY NEEDS
ABW used for calculations as pt between % of IBW.   ABW 85kg, IBW 72kg, 118% IBW, ht 69", BMI 27.7   Nutrient needs based on [facility] standards of care for repletion in adults, adjusted for post-op needs, fluid per team

## 2019-10-16 NOTE — PROGRESS NOTE ADULT - SUBJECTIVE AND OBJECTIVE BOX
NONI LUNDBERG   MRN#: [id number]     The patient is a 54y Male who was seen, evaluated, & examined with the CTICU staff post-operatively with a multidisciplinary care plan formulated & implemented.  All available clinical, laboratory, radiographic, pharmacologic, and electrocardiographic data were reviewed & analyzed.      The patient was in the CTICU in critical condition secondary to:     persistent cardiopulmonary dysfunction  malignant hypertension      For support and evaluation & prevention of further decompensation secondary to persistent cardiopulmonary dysfunction and cardiogenic shock-cardiovascular dysfunction, respiratory status required:     supplemental oxygen with full ventilatory support / mechanical ventilation  continuous pulse oximetry monitoring  following ABGs with A-line monitoring  IV Propofol infusion    Invasive hemodynamic monitoring with an A-line was required for continuous MAP/BP monitoring to ensure adequate cardiovascular support and to evaluate for & help prevent decompensation while receiving IV Cardene infusion secondary to malignant hypertension.    In addition:  S/p uncomplicated OPCAB today; MIDCAB aborted yesterday after 5 minute VT arrest  On Cardene for blood pressure control - MAPs 75-80  Air leak noted in pleural tube, x-ray is pending - deferring vent weaning currently  Follows commands off sedation - may need to transition Propofol to Precedex to facilitate extubation    The patient required critical care management and I personally provided 75 minutes of non-continuous care to the patient, excluding separate procedures, in addition to  discussing the patient and plan at length with the CTICU staff and helping coordinate care.

## 2019-10-16 NOTE — BRIEF OPERATIVE NOTE - NSICDXBRIEFPOSTOP_GEN_ALL_CORE_FT
POST-OP DIAGNOSIS:  Non-ST elevation MI (NSTEMI) 15-Oct-2019 07:06:05  Lorri Angela  CAD in native artery 15-Oct-2019 07:05:57  Lorri Angela
POST-OP DIAGNOSIS:  CAD, multiple vessel 16-Oct-2019 18:08:29  Mark Locke  Non-ST elevation MI (NSTEMI) 15-Oct-2019 07:06:05  Lorri Angela  CAD in native artery 15-Oct-2019 07:05:57  Lorri Angela

## 2019-10-16 NOTE — PROGRESS NOTE ADULT - SUBJECTIVE AND OBJECTIVE BOX
CTICU  CRITICAL  CARE  attending     Hand off received 					   Pertinent clinical, laboratory, radiographic, hemodynamic, echocardiographic, respiratory data, microbiologic data and chart were reviewed and analyzed frequently throughout the course of the day and night  Patient seen and examined with CTS/ SH attending at bedside  Pt is a 54y , Male, HEALTH ISSUES - PROBLEM Dx:  Type 1 diabetes mellitus without complication: Type 1 diabetes mellitus without complication  Hypertension, unspecified type: Hypertension, unspecified type  H/O non-ST elevation myocardial infarction (NSTEMI): H/O non-ST elevation myocardial infarction (NSTEMI)  Coronary artery disease involving native heart with unstable angina pectoris, unspecified vessel or lesion type: Coronary artery disease involving native heart with unstable angina pectoris, unspecified vessel or lesion type      , FAMILY HISTORY:  No pertinent family history in first degree relatives  PAST MEDICAL & SURGICAL HISTORY:  H/O unstable angina  DM (diabetes mellitus)  H/O non-ST elevation myocardial infarction (NSTEMI)  HTN (hypertension)  No significant past surgical history    Patient is a 54y old  Male who presents with a chief complaint of CABG (16 Oct 2019 23:17)      14 system review was unremarkable    Vital signs, hemodynamic and respiratory parameters were reviewed from the bedside nursing flowsheet.  ICU Vital Signs Last 24 Hrs  T(C): 36.4 (17 Oct 2019 10:00), Max: 37.8 (17 Oct 2019 01:01)  T(F): 97.6 (17 Oct 2019 10:00), Max: 100 (17 Oct 2019 01:01)  HR: 76 (17 Oct 2019 08:00) (62 - 82)  BP: 107/36 (17 Oct 2019 08:00) (97/61 - 140/73)  BP(mean): 73 (17 Oct 2019 08:00) (70 - 89)  ABP: 118/54 (17 Oct 2019 08:00) (106/50 - 172/72)  ABP(mean): 74 (17 Oct 2019 08:00) (66 - 102)  RR: 19 (17 Oct 2019 08:00) (11 - 23)  SpO2: 97% (17 Oct 2019 08:00) (91% - 100%)    Adult Advanced Hemodynamics Last 24 Hrs  CVP(mm Hg): 5 (17 Oct 2019 08:00) (2 - 34)  CVP(cm H2O): --  CO: --  CI: --  PA: --  PA(mean): --  PCWP: --  SVR: --  SVRI: --  PVR: --  PVRI: --, ABG - ( 17 Oct 2019 10:08 )  pH, Arterial: 7.39  pH, Blood: x     /  pCO2: 38    /  pO2: 95    / HCO3: 22    / Base Excess: -2.4  /  SaO2: 97                Mode: AC/ CMV (Assist Control/ Continuous Mandatory Ventilation)  RR (machine): 12  TV (machine): 600  FiO2: 90  PEEP: 5  ITime: 1  MAP: 8  PIP: 18    Intake and output was reviewed and the fluid balance was calculated  Daily     Daily Weight in k (16 Oct 2019 13:41)  I&O's Summary    16 Oct 2019 07:  -  17 Oct 2019 07:00  --------------------------------------------------------  IN: 1833.6 mL / OUT: 2620 mL / NET: -786.4 mL    17 Oct 2019 07:01  -  17 Oct 2019 10:51  --------------------------------------------------------  IN: 10 mL / OUT: 135 mL / NET: -125 mL        All lines and drain sites were assessed  Glycemic trend was reviewedCAPILLARY BLOOD GLUCOSE      POCT Blood Glucose.: 156 mg/dL (17 Oct 2019 07:21)    No acute change in mental status  Auscultation of the chest reveals equal bs  Abdomen is soft  Extremities are warm and well perfused  Wounds appear clean and unremarkable  Antibiotics are periop    labs  CBC Full  -  ( 17 Oct 2019 10:09 )  WBC Count : 11.45 K/uL  RBC Count : 3.92 M/uL  Hemoglobin : 11.9 g/dL  Hematocrit : 36.9 %  Platelet Count - Automated : 162 K/uL  Mean Cell Volume : 94.1 fl  Mean Cell Hemoglobin : 30.4 pg  Mean Cell Hemoglobin Concentration : 32.2 gm/dL  Auto Neutrophil # : x  Auto Lymphocyte # : x  Auto Monocyte # : x  Auto Eosinophil # : x  Auto Basophil # : x  Auto Neutrophil % : x  Auto Lymphocyte % : x  Auto Monocyte % : x  Auto Eosinophil % : x  Auto Basophil % : x    10    138  |  103  |  18  ----------------------------<  209<H>  4.3   |  24  |  0.95    Ca    8.7      17 Oct 2019 10:09  Phos  4.6     10-  Mg     2.3     10-    TPro  6.4  /  Alb  4.0  /  TBili  0.7  /  DBili  x   /  AST  32  /  ALT  43  /  AlkPhos  74  10-    PT/INR - ( 17 Oct 2019 10:09 )   PT: 15.1 sec;   INR: 1.32          PTT - ( 17 Oct 2019 10:09 )  PTT:29.7 sec  The current medications were reviewed   MEDICATIONS  (STANDING):  aspirin enteric coated 81 milliGRAM(s) Oral daily  atorvastatin 40 milliGRAM(s) Oral at bedtime  calcium gluconate IVPB 2 Gram(s) IV Intermittent once  ceFAZolin  Injectable. 2000 milliGRAM(s) IV Push every 8 hours  chlorhexidine 2% Cloths 1 Application(s) Topical daily  clopidogrel Tablet 75 milliGRAM(s) Oral daily  dexmedetomidine Infusion 0.2 MICROgram(s)/kG/Hr (4.25 mL/Hr) IV Continuous <Continuous>  dextrose 5%. 1000 milliLiter(s) (50 mL/Hr) IV Continuous <Continuous>  dextrose 50% Injectable 50 milliLiter(s) IV Push every 15 minutes  dextrose 50% Injectable 25 milliLiter(s) IV Push every 15 minutes  dextrose 50% Injectable 12.5 Gram(s) IV Push once  dextrose 50% Injectable 25 Gram(s) IV Push once  dextrose 50% Injectable 25 Gram(s) IV Push once  heparin  Injectable 5000 Unit(s) SubCutaneous every 8 hours  insulin lispro (HumaLOG) corrective regimen sliding scale   SubCutaneous Before meals and at bedtime  insulin regular Infusion 1 Unit(s)/Hr (1 mL/Hr) IV Continuous <Continuous>  labetalol Infusion 0.5 mG/Min (30 mL/Hr) IV Continuous <Continuous>  pantoprazole    Tablet 40 milliGRAM(s) Oral before breakfast  polyethylene glycol 3350 17 Gram(s) Oral daily  sodium chloride 0.9%. 1000 milliLiter(s) (10 mL/Hr) IV Continuous <Continuous>    MEDICATIONS  (PRN):  acetaminophen   Tablet .. 650 milliGRAM(s) Oral every 6 hours PRN Mild Pain (1 - 3)  dextrose 40% Gel 15 Gram(s) Oral once PRN Blood Glucose LESS THAN 70 milliGRAM(s)/deciliter  fentaNYL    Injectable 25 MICROGram(s) IV Push every 3 hours PRN Severe Pain (7 - 10)  glucagon  Injectable 1 milliGRAM(s) IntraMuscular once PRN Glucose LESS THAN 70 milligrams/deciliter  oxyCODONE    5 mG/acetaminophen 325 mG 1 Tablet(s) Oral every 4 hours PRN Moderate Pain (4 - 6)  oxyCODONE    5 mG/acetaminophen 325 mG 2 Tablet(s) Oral every 6 hours PRN Severe Pain (7 - 10)       PROBLEM LIST/ ASSESSMENT:  HEALTH ISSUES - PROBLEM Dx:  Type 1 diabetes mellitus without complication: Type 1 diabetes mellitus without complication  Hypertension, unspecified type: Hypertension, unspecified type  H/O non-ST elevation myocardial infarction (NSTEMI): H/O non-ST elevation myocardial infarction (NSTEMI)  Coronary artery disease involving native heart with unstable angina pectoris, unspecified vessel or lesion type: Coronary artery disease involving native heart with unstable angina pectoris, unspecified vessel or lesion type      ,   Patient is a 54y old  Male who presents with a chief complaint of CABG (16 Oct 2019 23:17)     s/p cardiac surgery          My plan includes :  close hemodynamic, ventilatory and drain monitoring and management per post op routine    Monitor for arrhythmias and monitor parameters for organ perfusion  beta blockade not administered due to hemodynamic instability and bradycardia  monitor neurologic status  Head of the bed should remain elevated to 45 deg .   chest PT and IS will be encouraged  monitor adequacy of oxygenation and ventilation and attempt to wean oxygen  antibiotic regimen will be tailored to the clinical, laboratory and microbiologic data  Nutritional goals will be met using po eventually , ensure adequate caloric intake and montior the same  Stress ulcer and VTE prophylaxis will be achieved    Glycemic control is satisfactory  Electrolytes have been repleted as necessary and wound care has been carried out. Pain control has been achieved.   agressive physical therapy and early mobility and ambulation goals will be met   The family was updated about the course and plan  CRITICAL CARE TIME personally provided by me  in evaluation and management, reassessments, review and interpretation of labs and x-rays, ventilator and hemodynamic management, formulating a plan and coordinating care: ___90____ MIN.  Time does not include procedural time.  CTICU ATTENDING     					    Ferdinand Still MD

## 2019-10-16 NOTE — BRIEF OPERATIVE NOTE - NSICDXBRIEFPREOP_GEN_ALL_CORE_FT
PRE-OP DIAGNOSIS:  Non-ST elevation MI (NSTEMI) 15-Oct-2019 07:05:47  Lorri Angela  CAD in native artery 15-Oct-2019 07:05:38  Lorri Angela
PRE-OP DIAGNOSIS:  CAD, multiple vessel 16-Oct-2019 18:08:16  Mark Locke  Non-ST elevation MI (NSTEMI) 15-Oct-2019 07:05:47  Lorri Angela  CAD in native artery 15-Oct-2019 07:05:38  Lorri Angela

## 2019-10-16 NOTE — BRIEF OPERATIVE NOTE - NSICDXBRIEFPROCEDURE_GEN_ALL_CORE_FT
PROCEDURES:  CABG, robot-assisted, using internal thoracic artery, with endoscopic vessel procurement 15-Oct-2019 07:05:22  Lorri Angela
PROCEDURES:  CABG, with NISREEN 16-Oct-2019 18:07:47 op cabg x 3 (lima-lad, svg-pda) EF 60% Mark Locke  CABG, robot-assisted, using internal thoracic artery, with endoscopic vessel procurement 15-Oct-2019 07:05:22  Lorri Angela

## 2019-10-17 LAB
ALBUMIN SERPL ELPH-MCNC: 3.5 G/DL — SIGNIFICANT CHANGE UP (ref 3.3–5)
ALBUMIN SERPL ELPH-MCNC: 3.9 G/DL — SIGNIFICANT CHANGE UP (ref 3.3–5)
ALBUMIN SERPL ELPH-MCNC: 4 G/DL — SIGNIFICANT CHANGE UP (ref 3.3–5)
ALP SERPL-CCNC: 224 U/L — HIGH (ref 40–120)
ALP SERPL-CCNC: 74 U/L — SIGNIFICANT CHANGE UP (ref 40–120)
ALP SERPL-CCNC: 74 U/L — SIGNIFICANT CHANGE UP (ref 40–120)
ALT FLD-CCNC: 198 U/L — HIGH (ref 10–45)
ALT FLD-CCNC: 42 U/L — SIGNIFICANT CHANGE UP (ref 10–45)
ALT FLD-CCNC: 43 U/L — SIGNIFICANT CHANGE UP (ref 10–45)
ANION GAP SERPL CALC-SCNC: 10 MMOL/L — SIGNIFICANT CHANGE UP (ref 5–17)
ANION GAP SERPL CALC-SCNC: 10 MMOL/L — SIGNIFICANT CHANGE UP (ref 5–17)
ANION GAP SERPL CALC-SCNC: 11 MMOL/L — SIGNIFICANT CHANGE UP (ref 5–17)
APTT BLD: 27.8 SEC — SIGNIFICANT CHANGE UP (ref 27.5–36.3)
APTT BLD: 28.5 SEC — SIGNIFICANT CHANGE UP (ref 27.5–36.3)
APTT BLD: 28.7 SEC — SIGNIFICANT CHANGE UP (ref 27.5–36.3)
APTT BLD: 29.7 SEC — SIGNIFICANT CHANGE UP (ref 27.5–36.3)
AST SERPL-CCNC: 28 U/L — SIGNIFICANT CHANGE UP (ref 10–40)
AST SERPL-CCNC: 308 U/L — HIGH (ref 10–40)
AST SERPL-CCNC: 32 U/L — SIGNIFICANT CHANGE UP (ref 10–40)
BILIRUB SERPL-MCNC: 0.6 MG/DL — SIGNIFICANT CHANGE UP (ref 0.2–1.2)
BILIRUB SERPL-MCNC: 0.6 MG/DL — SIGNIFICANT CHANGE UP (ref 0.2–1.2)
BILIRUB SERPL-MCNC: 0.7 MG/DL — SIGNIFICANT CHANGE UP (ref 0.2–1.2)
BUN SERPL-MCNC: 15 MG/DL — SIGNIFICANT CHANGE UP (ref 7–23)
BUN SERPL-MCNC: 18 MG/DL — SIGNIFICANT CHANGE UP (ref 7–23)
BUN SERPL-MCNC: 28 MG/DL — HIGH (ref 7–23)
CALCIUM SERPL-MCNC: 8.5 MG/DL — SIGNIFICANT CHANGE UP (ref 8.4–10.5)
CALCIUM SERPL-MCNC: 8.7 MG/DL — SIGNIFICANT CHANGE UP (ref 8.4–10.5)
CALCIUM SERPL-MCNC: 8.7 MG/DL — SIGNIFICANT CHANGE UP (ref 8.4–10.5)
CHLORIDE SERPL-SCNC: 100 MMOL/L — SIGNIFICANT CHANGE UP (ref 96–108)
CHLORIDE SERPL-SCNC: 102 MMOL/L — SIGNIFICANT CHANGE UP (ref 96–108)
CHLORIDE SERPL-SCNC: 103 MMOL/L — SIGNIFICANT CHANGE UP (ref 96–108)
CO2 SERPL-SCNC: 24 MMOL/L — SIGNIFICANT CHANGE UP (ref 22–31)
CO2 SERPL-SCNC: 24 MMOL/L — SIGNIFICANT CHANGE UP (ref 22–31)
CO2 SERPL-SCNC: 25 MMOL/L — SIGNIFICANT CHANGE UP (ref 22–31)
CREAT SERPL-MCNC: 0.85 MG/DL — SIGNIFICANT CHANGE UP (ref 0.5–1.3)
CREAT SERPL-MCNC: 0.95 MG/DL — SIGNIFICANT CHANGE UP (ref 0.5–1.3)
CREAT SERPL-MCNC: 1.1 MG/DL — SIGNIFICANT CHANGE UP (ref 0.5–1.3)
GAS PNL BLDA: SIGNIFICANT CHANGE UP
GLUCOSE BLDC GLUCOMTR-MCNC: 144 MG/DL — HIGH (ref 70–99)
GLUCOSE BLDC GLUCOMTR-MCNC: 154 MG/DL — HIGH (ref 70–99)
GLUCOSE BLDC GLUCOMTR-MCNC: 156 MG/DL — HIGH (ref 70–99)
GLUCOSE BLDC GLUCOMTR-MCNC: 190 MG/DL — HIGH (ref 70–99)
GLUCOSE SERPL-MCNC: 179 MG/DL — HIGH (ref 70–99)
GLUCOSE SERPL-MCNC: 202 MG/DL — HIGH (ref 70–99)
GLUCOSE SERPL-MCNC: 209 MG/DL — HIGH (ref 70–99)
HCT VFR BLD CALC: 34.8 % — LOW (ref 39–50)
HCT VFR BLD CALC: 35.7 % — LOW (ref 39–50)
HCT VFR BLD CALC: 36.3 % — LOW (ref 39–50)
HCT VFR BLD CALC: 36.9 % — LOW (ref 39–50)
HGB BLD-MCNC: 11.5 G/DL — LOW (ref 13–17)
HGB BLD-MCNC: 11.6 G/DL — LOW (ref 13–17)
HGB BLD-MCNC: 11.7 G/DL — LOW (ref 13–17)
HGB BLD-MCNC: 11.9 G/DL — LOW (ref 13–17)
INR BLD: 1.32 — HIGH (ref 0.88–1.16)
INR BLD: 1.34 — HIGH (ref 0.88–1.16)
INR BLD: 1.38 — HIGH (ref 0.88–1.16)
INR BLD: 1.46 — HIGH (ref 0.88–1.16)
LACTATE SERPL-SCNC: 1 MMOL/L — SIGNIFICANT CHANGE UP (ref 0.5–2)
LACTATE SERPL-SCNC: 1.2 MMOL/L — SIGNIFICANT CHANGE UP (ref 0.5–2)
LACTATE SERPL-SCNC: 1.9 MMOL/L — SIGNIFICANT CHANGE UP (ref 0.5–2)
LACTATE SERPL-SCNC: 2.1 MMOL/L — HIGH (ref 0.5–2)
MAGNESIUM SERPL-MCNC: 1.9 MG/DL — SIGNIFICANT CHANGE UP (ref 1.6–2.6)
MAGNESIUM SERPL-MCNC: 2.2 MG/DL — SIGNIFICANT CHANGE UP (ref 1.6–2.6)
MAGNESIUM SERPL-MCNC: 2.3 MG/DL — SIGNIFICANT CHANGE UP (ref 1.6–2.6)
MAGNESIUM SERPL-MCNC: 2.3 MG/DL — SIGNIFICANT CHANGE UP (ref 1.6–2.6)
MCHC RBC-ENTMCNC: 30.3 PG — SIGNIFICANT CHANGE UP (ref 27–34)
MCHC RBC-ENTMCNC: 30.4 PG — SIGNIFICANT CHANGE UP (ref 27–34)
MCHC RBC-ENTMCNC: 30.5 PG — SIGNIFICANT CHANGE UP (ref 27–34)
MCHC RBC-ENTMCNC: 30.7 PG — SIGNIFICANT CHANGE UP (ref 27–34)
MCHC RBC-ENTMCNC: 32 GM/DL — SIGNIFICANT CHANGE UP (ref 32–36)
MCHC RBC-ENTMCNC: 32.2 GM/DL — SIGNIFICANT CHANGE UP (ref 32–36)
MCHC RBC-ENTMCNC: 32.8 GM/DL — SIGNIFICANT CHANGE UP (ref 32–36)
MCHC RBC-ENTMCNC: 33 GM/DL — SIGNIFICANT CHANGE UP (ref 32–36)
MCV RBC AUTO: 93 FL — SIGNIFICANT CHANGE UP (ref 80–100)
MCV RBC AUTO: 93 FL — SIGNIFICANT CHANGE UP (ref 80–100)
MCV RBC AUTO: 94.1 FL — SIGNIFICANT CHANGE UP (ref 80–100)
MCV RBC AUTO: 94.8 FL — SIGNIFICANT CHANGE UP (ref 80–100)
NRBC # BLD: 0 /100 WBCS — SIGNIFICANT CHANGE UP (ref 0–0)
PHOSPHATE SERPL-MCNC: 4.5 MG/DL — SIGNIFICANT CHANGE UP (ref 2.5–4.5)
PHOSPHATE SERPL-MCNC: 4.5 MG/DL — SIGNIFICANT CHANGE UP (ref 2.5–4.5)
PHOSPHATE SERPL-MCNC: 4.6 MG/DL — HIGH (ref 2.5–4.5)
PHOSPHATE SERPL-MCNC: 4.7 MG/DL — HIGH (ref 2.5–4.5)
PLATELET # BLD AUTO: 145 K/UL — LOW (ref 150–400)
PLATELET # BLD AUTO: 146 K/UL — LOW (ref 150–400)
PLATELET # BLD AUTO: 154 K/UL — SIGNIFICANT CHANGE UP (ref 150–400)
PLATELET # BLD AUTO: 162 K/UL — SIGNIFICANT CHANGE UP (ref 150–400)
POTASSIUM SERPL-MCNC: 4.3 MMOL/L — SIGNIFICANT CHANGE UP (ref 3.5–5.3)
POTASSIUM SERPL-MCNC: 4.4 MMOL/L — SIGNIFICANT CHANGE UP (ref 3.5–5.3)
POTASSIUM SERPL-MCNC: 4.8 MMOL/L — SIGNIFICANT CHANGE UP (ref 3.5–5.3)
POTASSIUM SERPL-SCNC: 4.3 MMOL/L — SIGNIFICANT CHANGE UP (ref 3.5–5.3)
POTASSIUM SERPL-SCNC: 4.4 MMOL/L — SIGNIFICANT CHANGE UP (ref 3.5–5.3)
POTASSIUM SERPL-SCNC: 4.8 MMOL/L — SIGNIFICANT CHANGE UP (ref 3.5–5.3)
PROT SERPL-MCNC: 5.5 G/DL — LOW (ref 6–8.3)
PROT SERPL-MCNC: 6.4 G/DL — SIGNIFICANT CHANGE UP (ref 6–8.3)
PROT SERPL-MCNC: 6.7 G/DL — SIGNIFICANT CHANGE UP (ref 6–8.3)
PROTHROM AB SERPL-ACNC: 15.1 SEC — HIGH (ref 10–12.9)
PROTHROM AB SERPL-ACNC: 15.3 SEC — HIGH (ref 10–12.9)
PROTHROM AB SERPL-ACNC: 15.8 SEC — HIGH (ref 10–12.9)
PROTHROM AB SERPL-ACNC: 16.7 SEC — HIGH (ref 10–12.9)
RBC # BLD: 3.74 M/UL — LOW (ref 4.2–5.8)
RBC # BLD: 3.83 M/UL — LOW (ref 4.2–5.8)
RBC # BLD: 3.84 M/UL — LOW (ref 4.2–5.8)
RBC # BLD: 3.92 M/UL — LOW (ref 4.2–5.8)
RBC # FLD: 12.1 % — SIGNIFICANT CHANGE UP (ref 10.3–14.5)
RBC # FLD: 12.2 % — SIGNIFICANT CHANGE UP (ref 10.3–14.5)
RBC # FLD: 12.4 % — SIGNIFICANT CHANGE UP (ref 10.3–14.5)
RBC # FLD: 12.5 % — SIGNIFICANT CHANGE UP (ref 10.3–14.5)
SODIUM SERPL-SCNC: 134 MMOL/L — LOW (ref 135–145)
SODIUM SERPL-SCNC: 137 MMOL/L — SIGNIFICANT CHANGE UP (ref 135–145)
SODIUM SERPL-SCNC: 138 MMOL/L — SIGNIFICANT CHANGE UP (ref 135–145)
WBC # BLD: 10.02 K/UL — SIGNIFICANT CHANGE UP (ref 3.8–10.5)
WBC # BLD: 11.12 K/UL — HIGH (ref 3.8–10.5)
WBC # BLD: 11.25 K/UL — HIGH (ref 3.8–10.5)
WBC # BLD: 11.45 K/UL — HIGH (ref 3.8–10.5)
WBC # FLD AUTO: 10.02 K/UL — SIGNIFICANT CHANGE UP (ref 3.8–10.5)
WBC # FLD AUTO: 11.12 K/UL — HIGH (ref 3.8–10.5)
WBC # FLD AUTO: 11.25 K/UL — HIGH (ref 3.8–10.5)
WBC # FLD AUTO: 11.45 K/UL — HIGH (ref 3.8–10.5)

## 2019-10-17 PROCEDURE — 71045 X-RAY EXAM CHEST 1 VIEW: CPT | Mod: 26

## 2019-10-17 PROCEDURE — 71045 X-RAY EXAM CHEST 1 VIEW: CPT | Mod: 26,77

## 2019-10-17 PROCEDURE — 99291 CRITICAL CARE FIRST HOUR: CPT

## 2019-10-17 RX ORDER — ALBUMIN HUMAN 25 %
250 VIAL (ML) INTRAVENOUS ONCE
Refills: 0 | Status: COMPLETED | OUTPATIENT
Start: 2019-10-17 | End: 2019-10-17

## 2019-10-17 RX ORDER — LABETALOL HCL 100 MG
10 TABLET ORAL ONCE
Refills: 0 | Status: COMPLETED | OUTPATIENT
Start: 2019-10-17 | End: 2019-10-17

## 2019-10-17 RX ORDER — MAGNESIUM SULFATE 500 MG/ML
2 VIAL (ML) INJECTION ONCE
Refills: 0 | Status: COMPLETED | OUTPATIENT
Start: 2019-10-17 | End: 2019-10-17

## 2019-10-17 RX ORDER — DEXTROSE 50 % IN WATER 50 %
25 SYRINGE (ML) INTRAVENOUS ONCE
Refills: 0 | Status: DISCONTINUED | OUTPATIENT
Start: 2019-10-17 | End: 2019-10-22

## 2019-10-17 RX ORDER — DEXTROSE 50 % IN WATER 50 %
12.5 SYRINGE (ML) INTRAVENOUS ONCE
Refills: 0 | Status: DISCONTINUED | OUTPATIENT
Start: 2019-10-17 | End: 2019-10-22

## 2019-10-17 RX ORDER — ACETAMINOPHEN 500 MG
1000 TABLET ORAL ONCE
Refills: 0 | Status: COMPLETED | OUTPATIENT
Start: 2019-10-17 | End: 2019-10-17

## 2019-10-17 RX ORDER — POLYETHYLENE GLYCOL 3350 17 G/17G
17 POWDER, FOR SOLUTION ORAL DAILY
Refills: 0 | Status: DISCONTINUED | OUTPATIENT
Start: 2019-10-17 | End: 2019-10-22

## 2019-10-17 RX ORDER — GLUCAGON INJECTION, SOLUTION 0.5 MG/.1ML
1 INJECTION, SOLUTION SUBCUTANEOUS ONCE
Refills: 0 | Status: DISCONTINUED | OUTPATIENT
Start: 2019-10-17 | End: 2019-10-22

## 2019-10-17 RX ORDER — DEXTROSE 50 % IN WATER 50 %
15 SYRINGE (ML) INTRAVENOUS ONCE
Refills: 0 | Status: DISCONTINUED | OUTPATIENT
Start: 2019-10-17 | End: 2019-10-22

## 2019-10-17 RX ORDER — INSULIN LISPRO 100/ML
VIAL (ML) SUBCUTANEOUS
Refills: 0 | Status: DISCONTINUED | OUTPATIENT
Start: 2019-10-17 | End: 2019-10-22

## 2019-10-17 RX ORDER — OXYCODONE AND ACETAMINOPHEN 5; 325 MG/1; MG/1
2 TABLET ORAL EVERY 6 HOURS
Refills: 0 | Status: DISCONTINUED | OUTPATIENT
Start: 2019-10-17 | End: 2019-10-20

## 2019-10-17 RX ORDER — FUROSEMIDE 40 MG
20 TABLET ORAL ONCE
Refills: 0 | Status: COMPLETED | OUTPATIENT
Start: 2019-10-17 | End: 2019-10-17

## 2019-10-17 RX ORDER — SODIUM CHLORIDE 9 MG/ML
1000 INJECTION, SOLUTION INTRAVENOUS
Refills: 0 | Status: DISCONTINUED | OUTPATIENT
Start: 2019-10-17 | End: 2019-10-22

## 2019-10-17 RX ORDER — PANTOPRAZOLE SODIUM 20 MG/1
40 TABLET, DELAYED RELEASE ORAL
Refills: 0 | Status: DISCONTINUED | OUTPATIENT
Start: 2019-10-17 | End: 2019-10-22

## 2019-10-17 RX ORDER — LABETALOL HCL 100 MG
100 TABLET ORAL ONCE
Refills: 0 | Status: COMPLETED | OUTPATIENT
Start: 2019-10-17 | End: 2019-10-17

## 2019-10-17 RX ORDER — HYDRALAZINE HCL 50 MG
10 TABLET ORAL ONCE
Refills: 0 | Status: COMPLETED | OUTPATIENT
Start: 2019-10-17 | End: 2019-10-17

## 2019-10-17 RX ORDER — SODIUM CHLORIDE 9 MG/ML
1000 INJECTION, SOLUTION INTRAVENOUS ONCE
Refills: 0 | Status: COMPLETED | OUTPATIENT
Start: 2019-10-17 | End: 2019-10-18

## 2019-10-17 RX ORDER — LABETALOL HCL 100 MG
200 TABLET ORAL EVERY 8 HOURS
Refills: 0 | Status: DISCONTINUED | OUTPATIENT
Start: 2019-10-17 | End: 2019-10-17

## 2019-10-17 RX ORDER — OXYCODONE AND ACETAMINOPHEN 5; 325 MG/1; MG/1
1 TABLET ORAL EVERY 4 HOURS
Refills: 0 | Status: DISCONTINUED | OUTPATIENT
Start: 2019-10-17 | End: 2019-10-20

## 2019-10-17 RX ORDER — LABETALOL HCL 100 MG
100 TABLET ORAL EVERY 8 HOURS
Refills: 0 | Status: DISCONTINUED | OUTPATIENT
Start: 2019-10-17 | End: 2019-10-17

## 2019-10-17 RX ORDER — LABETALOL HCL 100 MG
300 TABLET ORAL EVERY 8 HOURS
Refills: 0 | Status: DISCONTINUED | OUTPATIENT
Start: 2019-10-17 | End: 2019-10-22

## 2019-10-17 RX ORDER — LABETALOL HCL 100 MG
0.5 TABLET ORAL
Qty: 200 | Refills: 0 | Status: DISCONTINUED | OUTPATIENT
Start: 2019-10-17 | End: 2019-10-17

## 2019-10-17 RX ORDER — ACETAMINOPHEN 500 MG
650 TABLET ORAL EVERY 6 HOURS
Refills: 0 | Status: DISCONTINUED | OUTPATIENT
Start: 2019-10-17 | End: 2019-10-22

## 2019-10-17 RX ORDER — CALCIUM GLUCONATE 100 MG/ML
2 VIAL (ML) INTRAVENOUS ONCE
Refills: 0 | Status: COMPLETED | OUTPATIENT
Start: 2019-10-17 | End: 2019-10-17

## 2019-10-17 RX ADMIN — Medication 200 MILLIGRAM(S): at 21:43

## 2019-10-17 RX ADMIN — Medication 20 MILLIGRAM(S): at 13:03

## 2019-10-17 RX ADMIN — CHLORHEXIDINE GLUCONATE 5 MILLILITER(S): 213 SOLUTION TOPICAL at 01:00

## 2019-10-17 RX ADMIN — Medication 2: at 12:33

## 2019-10-17 RX ADMIN — Medication 1 DROP(S): at 12:35

## 2019-10-17 RX ADMIN — Medication 100 MILLIGRAM(S): at 22:48

## 2019-10-17 RX ADMIN — HEPARIN SODIUM 5000 UNIT(S): 5000 INJECTION INTRAVENOUS; SUBCUTANEOUS at 13:04

## 2019-10-17 RX ADMIN — Medication 2000 MILLIGRAM(S): at 13:05

## 2019-10-17 RX ADMIN — Medication 1 DROP(S): at 17:39

## 2019-10-17 RX ADMIN — Medication 125 MILLILITER(S): at 09:35

## 2019-10-17 RX ADMIN — CHLORHEXIDINE GLUCONATE 5 MILLILITER(S): 213 SOLUTION TOPICAL at 05:24

## 2019-10-17 RX ADMIN — Medication 30 MG/MIN: at 01:10

## 2019-10-17 RX ADMIN — Medication 1000 MILLIGRAM(S): at 13:31

## 2019-10-17 RX ADMIN — CLOPIDOGREL BISULFATE 75 MILLIGRAM(S): 75 TABLET, FILM COATED ORAL at 12:33

## 2019-10-17 RX ADMIN — Medication 10 MILLIGRAM(S): at 23:50

## 2019-10-17 RX ADMIN — Medication 2000 MILLIGRAM(S): at 05:14

## 2019-10-17 RX ADMIN — OXYCODONE AND ACETAMINOPHEN 1 TABLET(S): 5; 325 TABLET ORAL at 21:23

## 2019-10-17 RX ADMIN — Medication 1 DROP(S): at 23:54

## 2019-10-17 RX ADMIN — OXYCODONE AND ACETAMINOPHEN 2 TABLET(S): 5; 325 TABLET ORAL at 17:42

## 2019-10-17 RX ADMIN — Medication 100 MILLIGRAM(S): at 16:01

## 2019-10-17 RX ADMIN — Medication 10 MILLIGRAM(S): at 19:08

## 2019-10-17 RX ADMIN — POLYETHYLENE GLYCOL 3350 17 GRAM(S): 17 POWDER, FOR SOLUTION ORAL at 12:33

## 2019-10-17 RX ADMIN — Medication 50 GRAM(S): at 05:14

## 2019-10-17 RX ADMIN — Medication 400 MILLIGRAM(S): at 06:55

## 2019-10-17 RX ADMIN — Medication 81 MILLIGRAM(S): at 12:33

## 2019-10-17 RX ADMIN — Medication 400 MILLIGRAM(S): at 13:03

## 2019-10-17 RX ADMIN — ATORVASTATIN CALCIUM 40 MILLIGRAM(S): 80 TABLET, FILM COATED ORAL at 23:54

## 2019-10-17 RX ADMIN — Medication 250 MILLILITER(S): at 04:17

## 2019-10-17 RX ADMIN — OXYCODONE AND ACETAMINOPHEN 2 TABLET(S): 5; 325 TABLET ORAL at 09:40

## 2019-10-17 RX ADMIN — DEXMEDETOMIDINE HYDROCHLORIDE IN 0.9% SODIUM CHLORIDE 4.25 MICROGRAM(S)/KG/HR: 4 INJECTION INTRAVENOUS at 00:15

## 2019-10-17 RX ADMIN — NICARDIPINE HYDROCHLORIDE 25 MG/HR: 30 CAPSULE, EXTENDED RELEASE ORAL at 00:15

## 2019-10-17 RX ADMIN — DEXMEDETOMIDINE HYDROCHLORIDE IN 0.9% SODIUM CHLORIDE 4.25 MICROGRAM(S)/KG/HR: 4 INJECTION INTRAVENOUS at 05:23

## 2019-10-17 RX ADMIN — OXYCODONE AND ACETAMINOPHEN 1 TABLET(S): 5; 325 TABLET ORAL at 22:08

## 2019-10-17 RX ADMIN — Medication 1000 MILLIGRAM(S): at 07:10

## 2019-10-17 RX ADMIN — FENTANYL CITRATE 25 MICROGRAM(S): 50 INJECTION INTRAVENOUS at 04:23

## 2019-10-17 RX ADMIN — CHLORHEXIDINE GLUCONATE 15 MILLILITER(S): 213 SOLUTION TOPICAL at 05:24

## 2019-10-17 RX ADMIN — HEPARIN SODIUM 5000 UNIT(S): 5000 INJECTION INTRAVENOUS; SUBCUTANEOUS at 23:54

## 2019-10-17 RX ADMIN — Medication 10 MILLIGRAM(S): at 22:47

## 2019-10-17 RX ADMIN — OXYCODONE AND ACETAMINOPHEN 2 TABLET(S): 5; 325 TABLET ORAL at 08:45

## 2019-10-17 RX ADMIN — Medication 2000 MILLIGRAM(S): at 23:53

## 2019-10-17 RX ADMIN — FENTANYL CITRATE 25 MICROGRAM(S): 50 INJECTION INTRAVENOUS at 04:40

## 2019-10-17 RX ADMIN — HEPARIN SODIUM 5000 UNIT(S): 5000 INJECTION INTRAVENOUS; SUBCUTANEOUS at 05:55

## 2019-10-17 RX ADMIN — Medication 125 MILLILITER(S): at 08:11

## 2019-10-17 RX ADMIN — OXYCODONE AND ACETAMINOPHEN 2 TABLET(S): 5; 325 TABLET ORAL at 18:40

## 2019-10-17 NOTE — PHYSICAL THERAPY INITIAL EVALUATION ADULT - PERTINENT HX OF CURRENT PROBLEM, REHAB EVAL
54 year old male admitted at [facility] ED where he ruled in for NSTEMI (T wave inversion lateral leads). Subsequent Cath showed 3 vessel CAD with EF 65%- ostial LAD 70%;pLAD 30%;mRCA 70%;Right posterior lateral segment 70%; patent stent in OMB. Transferred to [facility] CTS under Dr. Lugo for MIDCAB evaluation and management.

## 2019-10-17 NOTE — PHYSICAL THERAPY INITIAL EVALUATION ADULT - GAIT DEVIATIONS NOTED, PT EVAL
steady, no loss of balance, reported feeling lightheaded with SpO2 85%, standing rest breaks x 6 due to VASQUEZ, verbal cues to increase depth of inspiration, lightheadedness resolved as he rested and SpO2 increased to 95%

## 2019-10-17 NOTE — PROGRESS NOTE ADULT - SUBJECTIVE AND OBJECTIVE BOX
CTICU  CRITICAL  CARE  attending     Hand off received 					   Pertinent clinical, laboratory, radiographic, hemodynamic, echocardiographic, respiratory data, microbiologic data and chart were reviewed and analyzed frequently throughout the course of the day and night  Patient seen and examined with CTS/ SH attending at bedside  Pt is a 54y , Male, HEALTH ISSUES - PROBLEM Dx:  Type 1 diabetes mellitus without complication: Type 1 diabetes mellitus without complication  Hypertension, unspecified type: Hypertension, unspecified type  H/O non-ST elevation myocardial infarction (NSTEMI): H/O non-ST elevation myocardial infarction (NSTEMI)  Coronary artery disease involving native heart with unstable angina pectoris, unspecified vessel or lesion type: Coronary artery disease involving native heart with unstable angina pectoris, unspecified vessel or lesion type      , FAMILY HISTORY:  No pertinent family history in first degree relatives  PAST MEDICAL & SURGICAL HISTORY:  H/O unstable angina  DM (diabetes mellitus)  H/O non-ST elevation myocardial infarction (NSTEMI)  HTN (hypertension)  No significant past surgical history    Patient is a 54y old  Male who presents with a chief complaint of CABG (16 Oct 2019 23:17)      14 system review was unremarkable    Vital signs, hemodynamic and respiratory parameters were reviewed from the bedside nursing flowsheet.  ICU Vital Signs Last 24 Hrs  T(C): 36.8 (17 Oct 2019 17:39), Max: 37.8 (17 Oct 2019 01:01)  T(F): 98.2 (17 Oct 2019 17:39), Max: 100 (17 Oct 2019 01:01)  HR: 82 (17 Oct 2019 19:00) (62 - 86)  BP: 107/36 (17 Oct 2019 08:00) (97/61 - 140/73)  BP(mean): 73 (17 Oct 2019 08:00) (70 - 89)  ABP: 144/58 (17 Oct 2019 19:00) (106/50 - 174/64)  ABP(mean): 86 (17 Oct 2019 19:00) (66 - 98)  RR: 30 (17 Oct 2019 19:00) (11 - 30)  SpO2: 96% (17 Oct 2019 19:00) (83% - 100%)    Adult Advanced Hemodynamics Last 24 Hrs  CVP(mm Hg): 4 (17 Oct 2019 10:01) (2 - 18)  CVP(cm H2O): --  CO: --  CI: --  PA: --  PA(mean): --  PCWP: --  SVR: --  SVRI: --  PVR: --  PVRI: --, ABG - ( 17 Oct 2019 16:31 )  pH, Arterial: 7.43  pH, Blood: x     /  pCO2: 37    /  pO2: 89    / HCO3: 24    / Base Excess: 0.4   /  SaO2: 96                Mode: AC/ CMV (Assist Control/ Continuous Mandatory Ventilation)  RR (machine): 12  TV (machine): 600  FiO2: 90  PEEP: 5  ITime: 1  MAP: 8  PIP: 18    Intake and output was reviewed and the fluid balance was calculated  Daily     Daily   I&O's Summary    16 Oct 2019 07:01  -  17 Oct 2019 07:00  --------------------------------------------------------  IN: 1833.6 mL / OUT: 2620 mL / NET: -786.4 mL    17 Oct 2019 07:01  -  17 Oct 2019 19:52  --------------------------------------------------------  IN: 10 mL / OUT: 925 mL / NET: -915 mL        All lines and drain sites were assessed  Glycemic trend was reviewedCAPILLARY BLOOD GLUCOSE      POCT Blood Glucose.: 154 mg/dL (17 Oct 2019 16:17)    No acute change in mental status  Auscultation of the chest reveals equal bs  Abdomen is soft  Extremities are warm and well perfused  Wounds appear clean and unremarkable  Antibiotics are periop    labs  CBC Full  -  ( 17 Oct 2019 16:23 )  WBC Count : 11.25 K/uL  RBC Count : 3.84 M/uL  Hemoglobin : 11.7 g/dL  Hematocrit : 35.7 %  Platelet Count - Automated : 154 K/uL  Mean Cell Volume : 93.0 fl  Mean Cell Hemoglobin : 30.5 pg  Mean Cell Hemoglobin Concentration : 32.8 gm/dL  Auto Neutrophil # : x  Auto Lymphocyte # : x  Auto Monocyte # : x  Auto Eosinophil # : x  Auto Basophil # : x  Auto Neutrophil % : x  Auto Lymphocyte % : x  Auto Monocyte % : x  Auto Eosinophil % : x  Auto Basophil % : x    10-17    138  |  103  |  18  ----------------------------<  209<H>  4.3   |  24  |  0.95    Ca    8.7      17 Oct 2019 10:09  Phos  4.7     10-17  Mg     2.3     10-17    TPro  6.4  /  Alb  4.0  /  TBili  0.7  /  DBili  x   /  AST  32  /  ALT  43  /  AlkPhos  74  10-17    PT/INR - ( 17 Oct 2019 16:23 )   PT: 15.8 sec;   INR: 1.38          PTT - ( 17 Oct 2019 16:23 )  PTT:27.8 sec  The current medications were reviewed   MEDICATIONS  (STANDING):  artificial  tears Solution 1 Drop(s) Both EYES every 6 hours  aspirin enteric coated 81 milliGRAM(s) Oral daily  atorvastatin 40 milliGRAM(s) Oral at bedtime  ceFAZolin  Injectable. 2000 milliGRAM(s) IV Push every 8 hours  chlorhexidine 2% Cloths 1 Application(s) Topical daily  clopidogrel Tablet 75 milliGRAM(s) Oral daily  dextrose 5%. 1000 milliLiter(s) (50 mL/Hr) IV Continuous <Continuous>  dextrose 50% Injectable 50 milliLiter(s) IV Push every 15 minutes  dextrose 50% Injectable 25 milliLiter(s) IV Push every 15 minutes  dextrose 50% Injectable 12.5 Gram(s) IV Push once  dextrose 50% Injectable 25 Gram(s) IV Push once  dextrose 50% Injectable 25 Gram(s) IV Push once  heparin  Injectable 5000 Unit(s) SubCutaneous every 8 hours  insulin lispro (HumaLOG) corrective regimen sliding scale   SubCutaneous Before meals and at bedtime  labetalol 200 milliGRAM(s) Oral every 8 hours  pantoprazole    Tablet 40 milliGRAM(s) Oral before breakfast  polyethylene glycol 3350 17 Gram(s) Oral daily  sodium chloride 0.9%. 1000 milliLiter(s) (10 mL/Hr) IV Continuous <Continuous>    MEDICATIONS  (PRN):  acetaminophen   Tablet .. 650 milliGRAM(s) Oral every 6 hours PRN Mild Pain (1 - 3)  dextrose 40% Gel 15 Gram(s) Oral once PRN Blood Glucose LESS THAN 70 milliGRAM(s)/deciliter  glucagon  Injectable 1 milliGRAM(s) IntraMuscular once PRN Glucose LESS THAN 70 milligrams/deciliter  oxyCODONE    5 mG/acetaminophen 325 mG 1 Tablet(s) Oral every 4 hours PRN Moderate Pain (4 - 6)  oxyCODONE    5 mG/acetaminophen 325 mG 2 Tablet(s) Oral every 6 hours PRN Severe Pain (7 - 10)       PROBLEM LIST/ ASSESSMENT:  HEALTH ISSUES - PROBLEM Dx:  Type 1 diabetes mellitus without complication: Type 1 diabetes mellitus without complication  Hypertension, unspecified type: Hypertension, unspecified type  H/O non-ST elevation myocardial infarction (NSTEMI): H/O non-ST elevation myocardial infarction (NSTEMI)  Coronary artery disease involving native heart with unstable angina pectoris, unspecified vessel or lesion type: Coronary artery disease involving native heart with unstable angina pectoris, unspecified vessel or lesion type      ,   Patient is a 54y old  Male who presents with a chief complaint of CABG (16 Oct 2019 23:17)     s/p cardiac surgery          My plan includes :  close hemodynamic, ventilatory and drain monitoring and management per post op routine    Monitor for arrhythmias and monitor parameters for organ perfusion  beta blockade not administered due to hemodynamic instability and bradycardia  monitor neurologic status  Head of the bed should remain elevated to 45 deg .   chest PT and IS will be encouraged  monitor adequacy of oxygenation and ventilation and attempt to wean oxygen  antibiotic regimen will be tailored to the clinical, laboratory and microbiologic data  Nutritional goals will be met using po eventually , ensure adequate caloric intake and montior the same  Stress ulcer and VTE prophylaxis will be achieved    Glycemic control is satisfactory  Electrolytes have been repleted as necessary and wound care has been carried out. Pain control has been achieved.   agressive physical therapy and early mobility and ambulation goals will be met   The family was updated about the course and plan  CRITICAL CARE TIME personally provided by me  in evaluation and management, reassessments, review and interpretation of labs and x-rays, ventilator and hemodynamic management, formulating a plan and coordinating care: ___90____ MIN.  Time does not include procedural time.  CTICU ATTENDING     					    Ferdinand Still MD

## 2019-10-17 NOTE — PHYSICAL THERAPY INITIAL EVALUATION ADULT - GENERAL OBSERVATIONS, REHAB EVAL
patient received seated out of bed with no acute distress. +EKG +miguel +central line +high flow NC FiO2 40% +SCDs +myesha to wall suction x 2, +chest tube to self suction, +temporary pacemaker

## 2019-10-18 LAB
ALBUMIN SERPL ELPH-MCNC: 3.6 G/DL — SIGNIFICANT CHANGE UP (ref 3.3–5)
ALBUMIN SERPL ELPH-MCNC: 3.6 G/DL — SIGNIFICANT CHANGE UP (ref 3.3–5)
ALP SERPL-CCNC: 183 U/L — HIGH (ref 40–120)
ALP SERPL-CCNC: 194 U/L — HIGH (ref 40–120)
ALT FLD-CCNC: 122 U/L — HIGH (ref 10–45)
ALT FLD-CCNC: 142 U/L — HIGH (ref 10–45)
ANION GAP SERPL CALC-SCNC: 10 MMOL/L — SIGNIFICANT CHANGE UP (ref 5–17)
ANION GAP SERPL CALC-SCNC: 11 MMOL/L — SIGNIFICANT CHANGE UP (ref 5–17)
APTT BLD: 28.3 SEC — SIGNIFICANT CHANGE UP (ref 27.5–36.3)
APTT BLD: 29.4 SEC — SIGNIFICANT CHANGE UP (ref 27.5–36.3)
AST SERPL-CCNC: 134 U/L — HIGH (ref 10–40)
AST SERPL-CCNC: 89 U/L — HIGH (ref 10–40)
BILIRUB SERPL-MCNC: 0.4 MG/DL — SIGNIFICANT CHANGE UP (ref 0.2–1.2)
BILIRUB SERPL-MCNC: 0.5 MG/DL — SIGNIFICANT CHANGE UP (ref 0.2–1.2)
BUN SERPL-MCNC: 21 MG/DL — SIGNIFICANT CHANGE UP (ref 7–23)
BUN SERPL-MCNC: 21 MG/DL — SIGNIFICANT CHANGE UP (ref 7–23)
CALCIUM SERPL-MCNC: 8.5 MG/DL — SIGNIFICANT CHANGE UP (ref 8.4–10.5)
CALCIUM SERPL-MCNC: 9.2 MG/DL — SIGNIFICANT CHANGE UP (ref 8.4–10.5)
CHLORIDE SERPL-SCNC: 101 MMOL/L — SIGNIFICANT CHANGE UP (ref 96–108)
CHLORIDE SERPL-SCNC: 102 MMOL/L — SIGNIFICANT CHANGE UP (ref 96–108)
CO2 SERPL-SCNC: 25 MMOL/L — SIGNIFICANT CHANGE UP (ref 22–31)
CO2 SERPL-SCNC: 25 MMOL/L — SIGNIFICANT CHANGE UP (ref 22–31)
CREAT SERPL-MCNC: 0.92 MG/DL — SIGNIFICANT CHANGE UP (ref 0.5–1.3)
CREAT SERPL-MCNC: 0.93 MG/DL — SIGNIFICANT CHANGE UP (ref 0.5–1.3)
GAS PNL BLDA: SIGNIFICANT CHANGE UP
GAS PNL BLDA: SIGNIFICANT CHANGE UP
GLUCOSE BLDC GLUCOMTR-MCNC: 119 MG/DL — HIGH (ref 70–99)
GLUCOSE BLDC GLUCOMTR-MCNC: 159 MG/DL — HIGH (ref 70–99)
GLUCOSE BLDC GLUCOMTR-MCNC: 189 MG/DL — HIGH (ref 70–99)
GLUCOSE BLDC GLUCOMTR-MCNC: 195 MG/DL — HIGH (ref 70–99)
GLUCOSE SERPL-MCNC: 159 MG/DL — HIGH (ref 70–99)
GLUCOSE SERPL-MCNC: 217 MG/DL — HIGH (ref 70–99)
HCT VFR BLD CALC: 32 % — LOW (ref 39–50)
HCT VFR BLD CALC: 35.1 % — LOW (ref 39–50)
HGB BLD-MCNC: 10.5 G/DL — LOW (ref 13–17)
HGB BLD-MCNC: 11.3 G/DL — LOW (ref 13–17)
INR BLD: 1.27 — HIGH (ref 0.88–1.16)
INR BLD: 1.48 — HIGH (ref 0.88–1.16)
LACTATE SERPL-SCNC: 0.9 MMOL/L — SIGNIFICANT CHANGE UP (ref 0.5–2)
LACTATE SERPL-SCNC: 1.2 MMOL/L — SIGNIFICANT CHANGE UP (ref 0.5–2)
MAGNESIUM SERPL-MCNC: 1.8 MG/DL — SIGNIFICANT CHANGE UP (ref 1.6–2.6)
MAGNESIUM SERPL-MCNC: 2 MG/DL — SIGNIFICANT CHANGE UP (ref 1.6–2.6)
MCHC RBC-ENTMCNC: 30.4 PG — SIGNIFICANT CHANGE UP (ref 27–34)
MCHC RBC-ENTMCNC: 30.5 PG — SIGNIFICANT CHANGE UP (ref 27–34)
MCHC RBC-ENTMCNC: 32.2 GM/DL — SIGNIFICANT CHANGE UP (ref 32–36)
MCHC RBC-ENTMCNC: 32.8 GM/DL — SIGNIFICANT CHANGE UP (ref 32–36)
MCV RBC AUTO: 93 FL — SIGNIFICANT CHANGE UP (ref 80–100)
MCV RBC AUTO: 94.4 FL — SIGNIFICANT CHANGE UP (ref 80–100)
NRBC # BLD: 0 /100 WBCS — SIGNIFICANT CHANGE UP (ref 0–0)
NRBC # BLD: 0 /100 WBCS — SIGNIFICANT CHANGE UP (ref 0–0)
PHOSPHATE SERPL-MCNC: 3.3 MG/DL — SIGNIFICANT CHANGE UP (ref 2.5–4.5)
PHOSPHATE SERPL-MCNC: 4.1 MG/DL — SIGNIFICANT CHANGE UP (ref 2.5–4.5)
PLATELET # BLD AUTO: 123 K/UL — LOW (ref 150–400)
PLATELET # BLD AUTO: 146 K/UL — LOW (ref 150–400)
POTASSIUM SERPL-MCNC: 4.1 MMOL/L — SIGNIFICANT CHANGE UP (ref 3.5–5.3)
POTASSIUM SERPL-MCNC: 4.4 MMOL/L — SIGNIFICANT CHANGE UP (ref 3.5–5.3)
POTASSIUM SERPL-SCNC: 4.1 MMOL/L — SIGNIFICANT CHANGE UP (ref 3.5–5.3)
POTASSIUM SERPL-SCNC: 4.4 MMOL/L — SIGNIFICANT CHANGE UP (ref 3.5–5.3)
PROT SERPL-MCNC: 5.7 G/DL — LOW (ref 6–8.3)
PROT SERPL-MCNC: 6.7 G/DL — SIGNIFICANT CHANGE UP (ref 6–8.3)
PROTHROM AB SERPL-ACNC: 14.4 SEC — HIGH (ref 10–12.9)
PROTHROM AB SERPL-ACNC: 16.9 SEC — HIGH (ref 10–12.9)
RBC # BLD: 3.44 M/UL — LOW (ref 4.2–5.8)
RBC # BLD: 3.72 M/UL — LOW (ref 4.2–5.8)
RBC # FLD: 12.4 % — SIGNIFICANT CHANGE UP (ref 10.3–14.5)
RBC # FLD: 12.5 % — SIGNIFICANT CHANGE UP (ref 10.3–14.5)
SODIUM SERPL-SCNC: 137 MMOL/L — SIGNIFICANT CHANGE UP (ref 135–145)
SODIUM SERPL-SCNC: 137 MMOL/L — SIGNIFICANT CHANGE UP (ref 135–145)
WBC # BLD: 10.83 K/UL — HIGH (ref 3.8–10.5)
WBC # BLD: 9.78 K/UL — SIGNIFICANT CHANGE UP (ref 3.8–10.5)
WBC # FLD AUTO: 10.83 K/UL — HIGH (ref 3.8–10.5)
WBC # FLD AUTO: 9.78 K/UL — SIGNIFICANT CHANGE UP (ref 3.8–10.5)

## 2019-10-18 PROCEDURE — 99233 SBSQ HOSP IP/OBS HIGH 50: CPT

## 2019-10-18 PROCEDURE — 71045 X-RAY EXAM CHEST 1 VIEW: CPT | Mod: 26

## 2019-10-18 PROCEDURE — 71045 X-RAY EXAM CHEST 1 VIEW: CPT | Mod: 26,77

## 2019-10-18 RX ORDER — OFLOXACIN 0.3 %
1 DROPS OPHTHALMIC (EYE)
Refills: 0 | Status: DISCONTINUED | OUTPATIENT
Start: 2019-10-18 | End: 2019-10-22

## 2019-10-18 RX ORDER — FUROSEMIDE 40 MG
20 TABLET ORAL ONCE
Refills: 0 | Status: COMPLETED | OUTPATIENT
Start: 2019-10-18 | End: 2019-10-18

## 2019-10-18 RX ORDER — ERYTHROMYCIN BASE 5 MG/GRAM
1 OINTMENT (GRAM) OPHTHALMIC (EYE)
Refills: 0 | Status: DISCONTINUED | OUTPATIENT
Start: 2019-10-18 | End: 2019-10-22

## 2019-10-18 RX ORDER — FUROSEMIDE 40 MG
20 TABLET ORAL DAILY
Refills: 0 | Status: DISCONTINUED | OUTPATIENT
Start: 2019-10-19 | End: 2019-10-22

## 2019-10-18 RX ORDER — POTASSIUM CHLORIDE 20 MEQ
10 PACKET (EA) ORAL DAILY
Refills: 0 | Status: DISCONTINUED | OUTPATIENT
Start: 2019-10-19 | End: 2019-10-22

## 2019-10-18 RX ORDER — SODIUM CHLORIDE 9 MG/ML
3 INJECTION INTRAMUSCULAR; INTRAVENOUS; SUBCUTANEOUS EVERY 8 HOURS
Refills: 0 | Status: DISCONTINUED | OUTPATIENT
Start: 2019-10-18 | End: 2019-10-22

## 2019-10-18 RX ORDER — MAGNESIUM SULFATE 500 MG/ML
2 VIAL (ML) INJECTION ONCE
Refills: 0 | Status: COMPLETED | OUTPATIENT
Start: 2019-10-18 | End: 2019-10-18

## 2019-10-18 RX ORDER — BUDESONIDE, MICRONIZED 100 %
0.25 POWDER (GRAM) MISCELLANEOUS
Refills: 0 | Status: DISCONTINUED | OUTPATIENT
Start: 2019-10-18 | End: 2019-10-22

## 2019-10-18 RX ADMIN — HEPARIN SODIUM 5000 UNIT(S): 5000 INJECTION INTRAVENOUS; SUBCUTANEOUS at 06:03

## 2019-10-18 RX ADMIN — OXYCODONE AND ACETAMINOPHEN 2 TABLET(S): 5; 325 TABLET ORAL at 23:07

## 2019-10-18 RX ADMIN — Medication 1 DROP(S): at 06:03

## 2019-10-18 RX ADMIN — Medication 1 APPLICATION(S): at 22:07

## 2019-10-18 RX ADMIN — HEPARIN SODIUM 5000 UNIT(S): 5000 INJECTION INTRAVENOUS; SUBCUTANEOUS at 14:39

## 2019-10-18 RX ADMIN — Medication 300 MILLIGRAM(S): at 05:17

## 2019-10-18 RX ADMIN — Medication 1 DROP(S): at 23:46

## 2019-10-18 RX ADMIN — Medication 2: at 12:25

## 2019-10-18 RX ADMIN — SODIUM CHLORIDE 333.33 MILLILITER(S): 9 INJECTION, SOLUTION INTRAVENOUS at 06:04

## 2019-10-18 RX ADMIN — SODIUM CHLORIDE 3 MILLILITER(S): 9 INJECTION INTRAMUSCULAR; INTRAVENOUS; SUBCUTANEOUS at 22:07

## 2019-10-18 RX ADMIN — Medication 300 MILLIGRAM(S): at 14:39

## 2019-10-18 RX ADMIN — Medication 300 MILLIGRAM(S): at 22:06

## 2019-10-18 RX ADMIN — POLYETHYLENE GLYCOL 3350 17 GRAM(S): 17 POWDER, FOR SOLUTION ORAL at 11:03

## 2019-10-18 RX ADMIN — HEPARIN SODIUM 5000 UNIT(S): 5000 INJECTION INTRAVENOUS; SUBCUTANEOUS at 22:05

## 2019-10-18 RX ADMIN — ATORVASTATIN CALCIUM 40 MILLIGRAM(S): 80 TABLET, FILM COATED ORAL at 22:05

## 2019-10-18 RX ADMIN — OXYCODONE AND ACETAMINOPHEN 2 TABLET(S): 5; 325 TABLET ORAL at 07:26

## 2019-10-18 RX ADMIN — Medication 0.25 MILLIGRAM(S): at 09:51

## 2019-10-18 RX ADMIN — CHLORHEXIDINE GLUCONATE 1 APPLICATION(S): 213 SOLUTION TOPICAL at 11:01

## 2019-10-18 RX ADMIN — Medication 50 GRAM(S): at 06:03

## 2019-10-18 RX ADMIN — Medication 0.25 MILLIGRAM(S): at 22:04

## 2019-10-18 RX ADMIN — Medication 2000 MILLIGRAM(S): at 06:00

## 2019-10-18 RX ADMIN — OXYCODONE AND ACETAMINOPHEN 2 TABLET(S): 5; 325 TABLET ORAL at 14:10

## 2019-10-18 RX ADMIN — OXYCODONE AND ACETAMINOPHEN 2 TABLET(S): 5; 325 TABLET ORAL at 08:30

## 2019-10-18 RX ADMIN — Medication 20 MILLIGRAM(S): at 09:51

## 2019-10-18 RX ADMIN — PANTOPRAZOLE SODIUM 40 MILLIGRAM(S): 20 TABLET, DELAYED RELEASE ORAL at 06:03

## 2019-10-18 RX ADMIN — Medication 1 DROP(S): at 11:01

## 2019-10-18 RX ADMIN — SODIUM CHLORIDE 3 MILLILITER(S): 9 INJECTION INTRAMUSCULAR; INTRAVENOUS; SUBCUTANEOUS at 14:37

## 2019-10-18 RX ADMIN — OXYCODONE AND ACETAMINOPHEN 2 TABLET(S): 5; 325 TABLET ORAL at 14:42

## 2019-10-18 RX ADMIN — Medication 2: at 07:26

## 2019-10-18 RX ADMIN — Medication 81 MILLIGRAM(S): at 11:02

## 2019-10-18 RX ADMIN — CHLORHEXIDINE GLUCONATE 1 APPLICATION(S): 213 SOLUTION TOPICAL at 06:04

## 2019-10-18 RX ADMIN — CLOPIDOGREL BISULFATE 75 MILLIGRAM(S): 75 TABLET, FILM COATED ORAL at 11:03

## 2019-10-18 RX ADMIN — Medication 1 DROP(S): at 17:24

## 2019-10-18 RX ADMIN — OXYCODONE AND ACETAMINOPHEN 2 TABLET(S): 5; 325 TABLET ORAL at 22:07

## 2019-10-18 RX ADMIN — Medication 2: at 22:05

## 2019-10-18 NOTE — PROGRESS NOTE ADULT - SUBJECTIVE AND OBJECTIVE BOX
ICU to telemetry transfer note    Operation / Date: 10/16/19 - OPCABG x 2 (lima-lad, svg-pda), EF 60%    SUBJECTIVE ASSESSMENT:  Patient feeling well with no acute complaints.  Denies HA, dizziness, CP, SOB, palpitations, N/V/D/C, leg swelling or calf tenderness.      Vital Signs Last 24 Hrs  T(C): 37.3 (18 Oct 2019 17:21), Max: 37.7 (18 Oct 2019 15:16)  T(F): 99.1 (18 Oct 2019 17:21), Max: 99.9 (18 Oct 2019 15:16)  HR: 84 (18 Oct 2019 15:16) (68 - 88)  BP: 131/70 (18 Oct 2019 15:16) (131/70 - 170/84)  BP(mean): 83 (18 Oct 2019 15:16) (83 - 123)  RR: 20 (18 Oct 2019 15:16) (11 - 32)  SpO2: 95% (18 Oct 2019 15:16) (88% - 100%)  I&O's Detail    17 Oct 2019 07:01  -  18 Oct 2019 07:00  --------------------------------------------------------  IN:    Oral Fluid: 250 mL    sodium chloride 0.9%: 10 mL  Total IN: 260 mL    OUT:    Chest Tube: 145 mL    Drain: 365 mL    Indwelling Catheter - Urethral: 125 mL    Voided: 1650 mL  Total OUT: 2285 mL    Total NET: -2025 mL      18 Oct 2019 07:01  -  18 Oct 2019 17:32  --------------------------------------------------------  IN:    sodium chloride 0.9%: 60 mL  Total IN: 60 mL    OUT:    Chest Tube: 10 mL    Voided: 1050 mL    Voided: 300 mL  Total OUT: 1360 mL    Total NET: -1300 mL    CHEST TUBE:  Yes, clamped.   YOSELYN DRAIN:  No.  EPICARDIAL WIRES: Yes.  TIE DOWNS: Yes.  SAGASTUME: No.    PHYSICAL EXAM:    General: Lying in bed, covered with blankets, comfortable, NAD    Neurological: A&O x 3 ROSARIO, no focal deficits    Cardiovascular: S1S2 RRR No M/G/R    Respiratory: +rales b/l no wheezing or rhonchi     Gastrointestinal: soft, nt/nd    Extremities: No edema, no calf tenderness b/l    Vascular: warm and well perfused    Incision Sites: MSI and left EVH site healing well, no drainage, no dehiscence, no erythema.     LABS:                        11.3   10.83 )-----------( 146      ( 18 Oct 2019 12:39 )             35.1     COUMADIN:  No.     PT/INR - ( 18 Oct 2019 12:39 )   PT: 14.4 sec;   INR: 1.27          PTT - ( 18 Oct 2019 12:39 )  PTT:29.4 sec    10-18    137  |  102  |  21  ----------------------------<  217<H>  4.4   |  25  |  0.92    Ca    9.2      18 Oct 2019 12:39  Phos  4.1     10-18  Mg     2.0     10-18    TPro  6.7  /  Alb  3.6  /  TBili  0.5  /  DBili  x   /  AST  89<H>  /  ALT  122<H>  /  AlkPhos  194<H>  10-18      MEDICATIONS  (STANDING):  artificial  tears Solution 1 Drop(s) Both EYES every 6 hours  aspirin enteric coated 81 milliGRAM(s) Oral daily  atorvastatin 40 milliGRAM(s) Oral at bedtime  buDESOnide    Inhalation Suspension 0.25 milliGRAM(s) Inhalation two times a day  clopidogrel Tablet 75 milliGRAM(s) Oral daily  dextrose 5%. 1000 milliLiter(s) (50 mL/Hr) IV Continuous <Continuous>  dextrose 50% Injectable 12.5 Gram(s) IV Push once  dextrose 50% Injectable 25 Gram(s) IV Push once  dextrose 50% Injectable 25 Gram(s) IV Push once  heparin  Injectable 5000 Unit(s) SubCutaneous every 8 hours  insulin lispro (HumaLOG) corrective regimen sliding scale   SubCutaneous Before meals and at bedtime  labetalol 300 milliGRAM(s) Oral every 8 hours  pantoprazole    Tablet 40 milliGRAM(s) Oral before breakfast  polyethylene glycol 3350 17 Gram(s) Oral daily  sodium chloride 0.9% lock flush 3 milliLiter(s) IV Push every 8 hours    MEDICATIONS  (PRN):  acetaminophen   Tablet .. 650 milliGRAM(s) Oral every 6 hours PRN Mild Pain (1 - 3)  dextrose 40% Gel 15 Gram(s) Oral once PRN Blood Glucose LESS THAN 70 milliGRAM(s)/deciliter  glucagon  Injectable 1 milliGRAM(s) IntraMuscular once PRN Glucose LESS THAN 70 milligrams/deciliter  oxyCODONE    5 mG/acetaminophen 325 mG 1 Tablet(s) Oral every 4 hours PRN Moderate Pain (4 - 6)  oxyCODONE    5 mG/acetaminophen 325 mG 2 Tablet(s) Oral every 6 hours PRN Severe Pain (7 - 10)        RADIOLOGY & ADDITIONAL TESTS:

## 2019-10-18 NOTE — PROGRESS NOTE ADULT - ASSESSMENT
53 y/o male current cocaine and marijuana user PMH CAD (s/p PCI 9/19), DM, HTN, HLD who presented originally to [facility] ED a few weeks ago with retrosternal chest pain where he ruled in for NSTEMI and states he received " two stents". He states he was supposed to follow up for surgery evaluation but his insurance lapsed and there were scheduling problems due to this issue. A few days ago he was walking in the park and felt severe chest pain while walking. Made it home and had breakfast but the chest pain became 8/10 retrosternal pressure/pain. EMS called and taken to [facility] ED where he ruled in for NSTEMI . Subsequent Cath showed 3 vessel CAD with EF 65%- ostial LAD 70%;pLAD 30%;mRCA 70%;Right posterior lateral segment 70%; patent stent in OMB. Pt was transferred here for surgical evaluation. On 10/15 patient planned for MIDCAB, however upon entering left chest patient went into V-tach during take down of left lung adhesions with monopolar cautery. Patient was shocked multiple times with chest compression in between to maintain CO. Total event lasted about 5 minutes and he was transferred to the CTICU intubated, on primacor and levophed.  While in the ICU the patient had no further arrhythmias and was extubated without complication.  On 10/16/19 he had an unevenful OPCAB x 2 LIMA-LAD and SVG-PDA and arrived back to the CTICU HD stable.  He was extubated POD 1 to HFNC.  He was noted to have airleak from his pleural chest tube and failed clamp trial on POD 1.  POD 2 he was weaned from HFNC, delined, and transferred to stepdown floor.      Neurovascular: No delirium. Pain well controlled with current regimen.  -con't tylenol and percocet prn     Cardiovascular: HD stable, HR controlled  -con't ASA/plavix and statin     -con't labetalol 300 mg TID, avoiding lopressor 2/2 hx of frequent cocaine use.   -lasix 20 mg QD     Respiratory: saturating well on 4 L NC  -If on oxygen wean to RA from for O2 Sat > 93%.  -Encourage C+DB and Use of IS 10x / hr while awake.  -left pleural CT with airleak post op, failed clamp trial POD 1.  Today, stable left apical PTX with CT clamped.  If CXR in  am stable, d/c CT.      GI: Stable.  -PPX.  -PO Diet.  -bowel regimen    Renal / : making adequate urine  -Monitor renal function while diuresing  -Monitor I/O's.  -replete electrolytes prn     Endocrine:    -con't mISS    Hematologic:  -HSQ for DVT ppx    ID:  -Observe for SIRS/Sepsis Syndrome.    Disposition:  -stepped down to telemetry

## 2019-10-18 NOTE — PROGRESS NOTE ADULT - SUBJECTIVE AND OBJECTIVE BOX
NONI LUNDBERG  MRN-[id number]    Patient is a 54y old  Male who presents with a chief complaint of CABG (16 Oct 2019 23:17)    HPI:  53 y/o male current cocaine and marijuana user PMH CAD (s/p PCI 9/19), DM, HTN, HLD who presented originally to [facility] ED a few weeks ago with retrosternal chest pain where he ruled in for NSTEMI and states he received " two stents" is now s/p CABG. He states he was supposed to follow up for surgery evaluation but his insurance lapsed and there were scheduling problems due to this issue. A few days ago he was walking in the park and felt severe chest pain while walking. Made it home and had breakfast but the chest pain became 8/10 retrosternal pressure/pain. Denies SOB, N&V, palpitations. EMS called and taken to [facility] ED where he ruled in for NSTEMI (T wave inversion lateral leads). Subsequent Cath showed 3 vessel CAD with EF 65%- ostial LAD 70%;pLAD 30%;mRCA 70%;Right posterior lateral segment 70%; patent stent in OMB. Transferred to [facility] CTS under Dr. Lugo for MIDCAB evaluation and management. He went for midcab but had a VT arrest for 5 minutes and after ROSC the procedure was aborted. He is now s/p CABG and has no complaints.     PAST MEDICAL & SURGICAL HISTORY:  H/O unstable angina  DM (diabetes mellitus)  H/O non-ST elevation myocardial infarction (NSTEMI)  HTN (hypertension)  No significant past surgical history    FAMILY HISTORY:  No pertinent family history in first degree relatives    Social History:  Denies frequent alcohol consumption. Endorses active smoking and cocaine use.     Allergies  Morphine Sulfate (Urticaria)    MEDICATIONS  (STANDING):  artificial  tears Solution 1 Drop(s) Both EYES every 6 hours  aspirin enteric coated 81 milliGRAM(s) Oral daily  atorvastatin 40 milliGRAM(s) Oral at bedtime  chlorhexidine 2% Cloths 1 Application(s) Topical daily  clopidogrel Tablet 75 milliGRAM(s) Oral daily  dextrose 5%. 1000 milliLiter(s) (50 mL/Hr) IV Continuous <Continuous>  dextrose 50% Injectable 50 milliLiter(s) IV Push every 15 minutes  dextrose 50% Injectable 25 milliLiter(s) IV Push every 15 minutes  dextrose 50% Injectable 12.5 Gram(s) IV Push once  dextrose 50% Injectable 25 Gram(s) IV Push once  dextrose 50% Injectable 25 Gram(s) IV Push once  heparin  Injectable 5000 Unit(s) SubCutaneous every 8 hours  insulin lispro (HumaLOG) corrective regimen sliding scale   SubCutaneous Before meals and at bedtime  labetalol 300 milliGRAM(s) Oral every 8 hours  pantoprazole    Tablet 40 milliGRAM(s) Oral before breakfast  polyethylene glycol 3350 17 Gram(s) Oral daily  sodium chloride 0.9%. 1000 milliLiter(s) (10 mL/Hr) IV Continuous <Continuous>    MEDICATIONS  (PRN):  acetaminophen   Tablet .. 650 milliGRAM(s) Oral every 6 hours PRN Mild Pain (1 - 3)  dextrose 40% Gel 15 Gram(s) Oral once PRN Blood Glucose LESS THAN 70 milliGRAM(s)/deciliter  glucagon  Injectable 1 milliGRAM(s) IntraMuscular once PRN Glucose LESS THAN 70 milligrams/deciliter  oxyCODONE    5 mG/acetaminophen 325 mG 1 Tablet(s) Oral every 4 hours PRN Moderate Pain (4 - 6)  oxyCODONE    5 mG/acetaminophen 325 mG 2 Tablet(s) Oral every 6 hours PRN Severe Pain (7 - 10)    Review of Systems:  Constitutional:  Negative for weight change, fever, malaise  HEENT:  Negative for sinus pain, hoarseness, sore throat, dysphagia, vision changes  Cardiovascular:  Negative for chest pain, palpitations, dizziness  Respiratory:  Negative for cough, wheezing, dyspnea  Gastrointestinal:  Negative for nausea, vomiting, diarrhea, melena  Musculoskeletal:  Negative for pain, swelling, stiffness   Neuro:  Negative for weakness, numbness, headache  Psych:  Negative for anxiety, depression  Endocrine:  Negative for polyuria, polydipsia, temperature Intolerance    All other systems negative unless otherwise stated    ICU Vital Signs Last 24 Hrs  T(C): 36.5 (18 Oct 2019 05:01), Max: 36.8 (17 Oct 2019 17:39)  T(F): 97.7 (18 Oct 2019 05:01), Max: 98.2 (17 Oct 2019 17:39)  HR: 78 (18 Oct 2019 08:06) (72 - 88)  BP: 170/84 (17 Oct 2019 23:00) (163/82 - 170/84)  BP(mean): 123 (17 Oct 2019 23:00) (109 - 123)  ABP: 134/54 (18 Oct 2019 08:00) (114/60 - 198/80)  ABP(mean): 80 (18 Oct 2019 08:00) (78 - 116)  RR: 18 (18 Oct 2019 08:06) (11 - 30)  SpO2: 99% (18 Oct 2019 08:06) (83% - 99%)    Daily     Daily   I&O's Summary    17 Oct 2019 07:01  -  18 Oct 2019 07:00  --------------------------------------------------------  IN: 260 mL / OUT: 2285 mL / NET: -2025 mL    Physical Exam:  Gen: Alert, no apparent distress  CV: Regular rate and rhythm no murmurs, rubs or gallops  Pulm: Clear to auscultation bilaterally, no rales, rhonchi or wheezes  Chest: Chest tubes/drains in place with dressings clean, dry and intact  GI: Abd is soft, non-tender and non-distended with +BS  Ext: No clubbing, cyanosis or edema  Neuro: A+Ox3, follows commands and moves all extremities    Labs:                          10.5   9.78  )-----------( 123      ( 18 Oct 2019 03:10 )             32.0       10-18    137  |  101  |  21  ----------------------------<  159<H>  4.1   |  25  |  0.93    Ca    8.5      18 Oct 2019 03:10  Phos  3.3     10-18  Mg     1.8     10-18    TPro  5.7<L>  /  Alb  3.6  /  TBili  0.4  /  DBili  x   /  AST  134<H>  /  ALT  142<H>  /  AlkPhos  183<H>  10-18  PT/INR - ( 18 Oct 2019 03:10 )   PT: 16.9 sec;   INR: 1.48     PTT - ( 18 Oct 2019 03:10 )  PTT:28.3 sec    Assessment/Plan: 53 y/o male current cocaine and marijuana user [facility] CAD (s/p PCI 9/19), DM, HTN, HLD who presented originally to [facility] ED a few weeks ago with retrosternal chest pain where he ruled in for NSTEMI and states he received " two stents" is now s/p CABG.     Neuro:   Pain control with PCA / Tylenol IV                                           Cardiovascular:    Stable hemodynamics  Not on any pressors  ASA, Plavix, Labetalol (cocaine use)  Continue hemodynamic monitoring    Respiratory:  Pt is comfortable on nasal cannula  Baseline smoking, pO2 80s on 6LNC  Encourage incentive spirometry  Will start nebulizers  Monitor chest tube output    GI:  Tolerating diabetic/DASH diet  Continue bowel regimen  Continue Zofran for nausea - PRN	          Renal:  Monitor I/Os and electrolytes  Diurese with Lasix for target 1L negative    Hematologic / Oncology:  No signs of active bleeding, H/H stable  Monitor chest tube output    HSQ for DVT ppl    Infectious disease:  All surgical incision / chest tube sites look clean  No fever or other signs of infection     Endocrine:  Continue Accuchecks with coverage    All clinical, lab, hemodynamic and radiographic data were reviewed and the plan was discussed with CTICU team.     Dylan Bundy MD NONI LUNDBERG  MRN-[id number]    Patient is a 54y old  Male who presents with a chief complaint of CABG (16 Oct 2019 23:17)    HPI:  53 y/o male current cocaine and marijuana user PMH CAD (s/p PCI 9/19), DM, HTN, HLD who presented originally to [facility] ED a few weeks ago with retrosternal chest pain where he ruled in for NSTEMI and states he received " two stents" is now s/p CABG. He states he was supposed to follow up for surgery evaluation but his insurance lapsed and there were scheduling problems due to this issue. A few days ago he was walking in the park and felt severe chest pain while walking. Made it home and had breakfast but the chest pain became 8/10 retrosternal pressure/pain. Denies SOB, N&V, palpitations. EMS called and taken to [facility] ED where he ruled in for NSTEMI (T wave inversion lateral leads). Subsequent Cath showed 3 vessel CAD with EF 65%- ostial LAD 70%;pLAD 30%;mRCA 70%;Right posterior lateral segment 70%; patent stent in OMB. Transferred to [facility] CTS under Dr. Lugo for MIDCAB evaluation and management. He went for midcab but had a VT arrest for 5 minutes and after ROSC the procedure was aborted. He is now s/p CABG and has no complaints.     PAST MEDICAL & SURGICAL HISTORY:  H/O unstable angina  DM (diabetes mellitus)  H/O non-ST elevation myocardial infarction (NSTEMI)  HTN (hypertension)  No significant past surgical history    FAMILY HISTORY:  No pertinent family history in first degree relatives    Social History:  Denies frequent alcohol consumption. Endorses active smoking and cocaine use.     Allergies  Morphine Sulfate (Urticaria)    MEDICATIONS  (STANDING):  artificial  tears Solution 1 Drop(s) Both EYES every 6 hours  aspirin enteric coated 81 milliGRAM(s) Oral daily  atorvastatin 40 milliGRAM(s) Oral at bedtime  chlorhexidine 2% Cloths 1 Application(s) Topical daily  clopidogrel Tablet 75 milliGRAM(s) Oral daily  dextrose 5%. 1000 milliLiter(s) (50 mL/Hr) IV Continuous <Continuous>  dextrose 50% Injectable 50 milliLiter(s) IV Push every 15 minutes  dextrose 50% Injectable 25 milliLiter(s) IV Push every 15 minutes  dextrose 50% Injectable 12.5 Gram(s) IV Push once  dextrose 50% Injectable 25 Gram(s) IV Push once  dextrose 50% Injectable 25 Gram(s) IV Push once  heparin  Injectable 5000 Unit(s) SubCutaneous every 8 hours  insulin lispro (HumaLOG) corrective regimen sliding scale   SubCutaneous Before meals and at bedtime  labetalol 300 milliGRAM(s) Oral every 8 hours  pantoprazole    Tablet 40 milliGRAM(s) Oral before breakfast  polyethylene glycol 3350 17 Gram(s) Oral daily  sodium chloride 0.9%. 1000 milliLiter(s) (10 mL/Hr) IV Continuous <Continuous>    MEDICATIONS  (PRN):  acetaminophen   Tablet .. 650 milliGRAM(s) Oral every 6 hours PRN Mild Pain (1 - 3)  dextrose 40% Gel 15 Gram(s) Oral once PRN Blood Glucose LESS THAN 70 milliGRAM(s)/deciliter  glucagon  Injectable 1 milliGRAM(s) IntraMuscular once PRN Glucose LESS THAN 70 milligrams/deciliter  oxyCODONE    5 mG/acetaminophen 325 mG 1 Tablet(s) Oral every 4 hours PRN Moderate Pain (4 - 6)  oxyCODONE    5 mG/acetaminophen 325 mG 2 Tablet(s) Oral every 6 hours PRN Severe Pain (7 - 10)    Review of Systems:  Constitutional:  Negative for weight change, fever, malaise  HEENT:  Negative for sinus pain, hoarseness, sore throat, dysphagia, vision changes  Cardiovascular:  Negative for chest pain, palpitations, dizziness  Respiratory:  Negative for cough, wheezing, dyspnea  Gastrointestinal:  Negative for nausea, vomiting, diarrhea, melena  Musculoskeletal:  Negative for pain, swelling, stiffness   Neuro:  Negative for weakness, numbness, headache  Psych:  Negative for anxiety, depression  Endocrine:  Negative for polyuria, polydipsia, temperature Intolerance    All other systems negative unless otherwise stated    ICU Vital Signs Last 24 Hrs  T(C): 36.5 (18 Oct 2019 05:01), Max: 36.8 (17 Oct 2019 17:39)  T(F): 97.7 (18 Oct 2019 05:01), Max: 98.2 (17 Oct 2019 17:39)  HR: 78 (18 Oct 2019 08:06) (72 - 88)  BP: 170/84 (17 Oct 2019 23:00) (163/82 - 170/84)  BP(mean): 123 (17 Oct 2019 23:00) (109 - 123)  ABP: 134/54 (18 Oct 2019 08:00) (114/60 - 198/80)  ABP(mean): 80 (18 Oct 2019 08:00) (78 - 116)  RR: 18 (18 Oct 2019 08:06) (11 - 30)  SpO2: 99% (18 Oct 2019 08:06) (83% - 99%)    Daily     Daily   I&O's Summary    17 Oct 2019 07:01  -  18 Oct 2019 07:00  --------------------------------------------------------  IN: 260 mL / OUT: 2285 mL / NET: -2025 mL    Physical Exam:  Gen: Alert, no apparent distress  CV: Regular rate and rhythm no murmurs, rubs or gallops  Pulm: Clear to auscultation bilaterally, no rales, rhonchi or wheezes  Chest: Chest tubes/drains in place with dressings clean, dry and intact  GI: Abd is soft, non-tender and non-distended with +BS  Ext: No clubbing, cyanosis or edema  Neuro: A+Ox3, follows commands and moves all extremities    Labs:                          10.5   9.78  )-----------( 123      ( 18 Oct 2019 03:10 )             32.0       10-18    137  |  101  |  21  ----------------------------<  159<H>  4.1   |  25  |  0.93    Ca    8.5      18 Oct 2019 03:10  Phos  3.3     10-18  Mg     1.8     10-18    TPro  5.7<L>  /  Alb  3.6  /  TBili  0.4  /  DBili  x   /  AST  134<H>  /  ALT  142<H>  /  AlkPhos  183<H>  10-18  PT/INR - ( 18 Oct 2019 03:10 )   PT: 16.9 sec;   INR: 1.48     PTT - ( 18 Oct 2019 03:10 )  PTT:28.3 sec    Assessment/Plan: 53 y/o male current cocaine and marijuana user [facility] CAD (s/p PCI 9/19), DM, HTN, HLD who presented originally to [facility] ED a few weeks ago with retrosternal chest pain where he ruled in for NSTEMI and states he received " two stents" is now s/p CABG.     Neuro:   Pain control with PCA / Tylenol IV                                           Cardiovascular:    Stable hemodynamics  Not on any pressors  ASA, Plavix, Labetalol (cocaine use)  Continue Lipitor despite elevated LFTs  Continue hemodynamic monitoring    Respiratory:  Pt is comfortable on nasal cannula  Baseline smoking, pO2 80s on 6LNC  Encourage incentive spirometry  Will start nebulizers  Monitor chest tube output    GI:  LFTs elevated after starting statin but downtrending - will continue statin so long as LFTs continue downward trend  Tolerating diabetic/DASH diet  Continue bowel regimen  Continue Zofran for nausea - PRN	          Renal:  Monitor I/Os and electrolytes  Diurese with Lasix for target 1L negative    Hematologic / Oncology:  No signs of active bleeding, H/H stable  Monitor chest tube output    HSQ for DVT ppl    Infectious disease:  All surgical incision / chest tube sites look clean  No fever or other signs of infection     Endocrine:  Continue Accuchecks with coverage    All clinical, lab, hemodynamic and radiographic data were reviewed and the plan was discussed with CTICU team.     Dylan Bundy MD

## 2019-10-18 NOTE — CHART NOTE - NSCHARTNOTEFT_GEN_A_CORE
RN called me to bedside because patient complained of right eye pain, which started since Tuesday (after attempted MIDCAB procedure). Patient states it "feels like something is in my eye," and states it has not gotten better or worse since Tuesday. Artifical tears have not offered relief. Upon physical exam, +corneal injection, PERRLA, EOMI, no foreign body seen, no prurulent drainage. Ophthalmology consulted. Will start patient on Ofloxacin drops 4x/day and Erythromycin ointment 2x/day. Please continue to monitor, and if pain gets worse or does not improve, please call Ophtho in AM, and will see patient.

## 2019-10-19 LAB
ANION GAP SERPL CALC-SCNC: 9 MMOL/L — SIGNIFICANT CHANGE UP (ref 5–17)
BASOPHILS # BLD AUTO: 0.04 K/UL — SIGNIFICANT CHANGE UP (ref 0–0.2)
BASOPHILS NFR BLD AUTO: 0.5 % — SIGNIFICANT CHANGE UP (ref 0–2)
BUN SERPL-MCNC: 19 MG/DL — SIGNIFICANT CHANGE UP (ref 7–23)
CALCIUM SERPL-MCNC: 8.9 MG/DL — SIGNIFICANT CHANGE UP (ref 8.4–10.5)
CHLORIDE SERPL-SCNC: 101 MMOL/L — SIGNIFICANT CHANGE UP (ref 96–108)
CO2 SERPL-SCNC: 27 MMOL/L — SIGNIFICANT CHANGE UP (ref 22–31)
CREAT SERPL-MCNC: 0.94 MG/DL — SIGNIFICANT CHANGE UP (ref 0.5–1.3)
EOSINOPHIL # BLD AUTO: 0.25 K/UL — SIGNIFICANT CHANGE UP (ref 0–0.5)
EOSINOPHIL NFR BLD AUTO: 2.9 % — SIGNIFICANT CHANGE UP (ref 0–6)
GLUCOSE BLDC GLUCOMTR-MCNC: 112 MG/DL — HIGH (ref 70–99)
GLUCOSE BLDC GLUCOMTR-MCNC: 148 MG/DL — HIGH (ref 70–99)
GLUCOSE BLDC GLUCOMTR-MCNC: 173 MG/DL — HIGH (ref 70–99)
GLUCOSE BLDC GLUCOMTR-MCNC: 181 MG/DL — HIGH (ref 70–99)
GLUCOSE SERPL-MCNC: 130 MG/DL — HIGH (ref 70–99)
HCT VFR BLD CALC: 31.5 % — LOW (ref 39–50)
HGB BLD-MCNC: 10.2 G/DL — LOW (ref 13–17)
IMM GRANULOCYTES NFR BLD AUTO: 0.3 % — SIGNIFICANT CHANGE UP (ref 0–1.5)
LYMPHOCYTES # BLD AUTO: 1.85 K/UL — SIGNIFICANT CHANGE UP (ref 1–3.3)
LYMPHOCYTES # BLD AUTO: 21.1 % — SIGNIFICANT CHANGE UP (ref 13–44)
MAGNESIUM SERPL-MCNC: 1.8 MG/DL — SIGNIFICANT CHANGE UP (ref 1.6–2.6)
MCHC RBC-ENTMCNC: 30.5 PG — SIGNIFICANT CHANGE UP (ref 27–34)
MCHC RBC-ENTMCNC: 32.4 GM/DL — SIGNIFICANT CHANGE UP (ref 32–36)
MCV RBC AUTO: 94.3 FL — SIGNIFICANT CHANGE UP (ref 80–100)
MONOCYTES # BLD AUTO: 0.78 K/UL — SIGNIFICANT CHANGE UP (ref 0–0.9)
MONOCYTES NFR BLD AUTO: 8.9 % — SIGNIFICANT CHANGE UP (ref 2–14)
NEUTROPHILS # BLD AUTO: 5.8 K/UL — SIGNIFICANT CHANGE UP (ref 1.8–7.4)
NEUTROPHILS NFR BLD AUTO: 66.3 % — SIGNIFICANT CHANGE UP (ref 43–77)
NRBC # BLD: 0 /100 WBCS — SIGNIFICANT CHANGE UP (ref 0–0)
PLATELET # BLD AUTO: 142 K/UL — LOW (ref 150–400)
POTASSIUM SERPL-MCNC: 4.2 MMOL/L — SIGNIFICANT CHANGE UP (ref 3.5–5.3)
POTASSIUM SERPL-SCNC: 4.2 MMOL/L — SIGNIFICANT CHANGE UP (ref 3.5–5.3)
RBC # BLD: 3.34 M/UL — LOW (ref 4.2–5.8)
RBC # FLD: 12.5 % — SIGNIFICANT CHANGE UP (ref 10.3–14.5)
SODIUM SERPL-SCNC: 137 MMOL/L — SIGNIFICANT CHANGE UP (ref 135–145)
WBC # BLD: 8.75 K/UL — SIGNIFICANT CHANGE UP (ref 3.8–10.5)
WBC # FLD AUTO: 8.75 K/UL — SIGNIFICANT CHANGE UP (ref 3.8–10.5)

## 2019-10-19 PROCEDURE — 71045 X-RAY EXAM CHEST 1 VIEW: CPT | Mod: 26,76

## 2019-10-19 RX ORDER — MAGNESIUM OXIDE 400 MG ORAL TABLET 241.3 MG
800 TABLET ORAL ONCE
Refills: 0 | Status: COMPLETED | OUTPATIENT
Start: 2019-10-19 | End: 2019-10-19

## 2019-10-19 RX ADMIN — Medication 1 DROP(S): at 06:11

## 2019-10-19 RX ADMIN — OXYCODONE AND ACETAMINOPHEN 1 TABLET(S): 5; 325 TABLET ORAL at 13:02

## 2019-10-19 RX ADMIN — OXYCODONE AND ACETAMINOPHEN 1 TABLET(S): 5; 325 TABLET ORAL at 05:03

## 2019-10-19 RX ADMIN — HEPARIN SODIUM 5000 UNIT(S): 5000 INJECTION INTRAVENOUS; SUBCUTANEOUS at 21:59

## 2019-10-19 RX ADMIN — OXYCODONE AND ACETAMINOPHEN 2 TABLET(S): 5; 325 TABLET ORAL at 14:31

## 2019-10-19 RX ADMIN — Medication 2: at 17:47

## 2019-10-19 RX ADMIN — OXYCODONE AND ACETAMINOPHEN 2 TABLET(S): 5; 325 TABLET ORAL at 22:09

## 2019-10-19 RX ADMIN — Medication 1 DROP(S): at 12:25

## 2019-10-19 RX ADMIN — Medication 81 MILLIGRAM(S): at 12:20

## 2019-10-19 RX ADMIN — Medication 20 MILLIGRAM(S): at 06:12

## 2019-10-19 RX ADMIN — Medication 300 MILLIGRAM(S): at 06:12

## 2019-10-19 RX ADMIN — HEPARIN SODIUM 5000 UNIT(S): 5000 INJECTION INTRAVENOUS; SUBCUTANEOUS at 06:12

## 2019-10-19 RX ADMIN — Medication 1 APPLICATION(S): at 18:04

## 2019-10-19 RX ADMIN — POLYETHYLENE GLYCOL 3350 17 GRAM(S): 17 POWDER, FOR SOLUTION ORAL at 12:20

## 2019-10-19 RX ADMIN — CLOPIDOGREL BISULFATE 75 MILLIGRAM(S): 75 TABLET, FILM COATED ORAL at 12:20

## 2019-10-19 RX ADMIN — Medication 300 MILLIGRAM(S): at 14:34

## 2019-10-19 RX ADMIN — OXYCODONE AND ACETAMINOPHEN 1 TABLET(S): 5; 325 TABLET ORAL at 05:33

## 2019-10-19 RX ADMIN — Medication 1 DROP(S): at 23:29

## 2019-10-19 RX ADMIN — Medication 1 DROP(S): at 12:22

## 2019-10-19 RX ADMIN — ATORVASTATIN CALCIUM 40 MILLIGRAM(S): 80 TABLET, FILM COATED ORAL at 21:59

## 2019-10-19 RX ADMIN — Medication 0.25 MILLIGRAM(S): at 09:32

## 2019-10-19 RX ADMIN — Medication 300 MILLIGRAM(S): at 21:59

## 2019-10-19 RX ADMIN — OXYCODONE AND ACETAMINOPHEN 1 TABLET(S): 5; 325 TABLET ORAL at 19:09

## 2019-10-19 RX ADMIN — HEPARIN SODIUM 5000 UNIT(S): 5000 INJECTION INTRAVENOUS; SUBCUTANEOUS at 14:34

## 2019-10-19 RX ADMIN — PANTOPRAZOLE SODIUM 40 MILLIGRAM(S): 20 TABLET, DELAYED RELEASE ORAL at 06:13

## 2019-10-19 RX ADMIN — OXYCODONE AND ACETAMINOPHEN 2 TABLET(S): 5; 325 TABLET ORAL at 22:52

## 2019-10-19 RX ADMIN — Medication 1 APPLICATION(S): at 12:24

## 2019-10-19 RX ADMIN — OXYCODONE AND ACETAMINOPHEN 2 TABLET(S): 5; 325 TABLET ORAL at 08:18

## 2019-10-19 RX ADMIN — Medication 1 DROP(S): at 06:13

## 2019-10-19 RX ADMIN — Medication 1 DROP(S): at 18:05

## 2019-10-19 RX ADMIN — MAGNESIUM OXIDE 400 MG ORAL TABLET 800 MILLIGRAM(S): 241.3 TABLET ORAL at 09:24

## 2019-10-19 RX ADMIN — OXYCODONE AND ACETAMINOPHEN 1 TABLET(S): 5; 325 TABLET ORAL at 19:40

## 2019-10-19 RX ADMIN — Medication 10 MILLIEQUIVALENT(S): at 12:24

## 2019-10-19 RX ADMIN — OXYCODONE AND ACETAMINOPHEN 2 TABLET(S): 5; 325 TABLET ORAL at 16:05

## 2019-10-19 RX ADMIN — OXYCODONE AND ACETAMINOPHEN 1 TABLET(S): 5; 325 TABLET ORAL at 14:17

## 2019-10-19 RX ADMIN — Medication 2: at 12:20

## 2019-10-19 RX ADMIN — SODIUM CHLORIDE 3 MILLILITER(S): 9 INJECTION INTRAMUSCULAR; INTRAVENOUS; SUBCUTANEOUS at 06:24

## 2019-10-19 RX ADMIN — SODIUM CHLORIDE 3 MILLILITER(S): 9 INJECTION INTRAMUSCULAR; INTRAVENOUS; SUBCUTANEOUS at 14:18

## 2019-10-19 RX ADMIN — SODIUM CHLORIDE 3 MILLILITER(S): 9 INJECTION INTRAMUSCULAR; INTRAVENOUS; SUBCUTANEOUS at 21:18

## 2019-10-19 RX ADMIN — Medication 0.25 MILLIGRAM(S): at 22:00

## 2019-10-19 RX ADMIN — OXYCODONE AND ACETAMINOPHEN 2 TABLET(S): 5; 325 TABLET ORAL at 09:31

## 2019-10-19 NOTE — PROGRESS NOTE ADULT - ASSESSMENT
53 y/o male current cocaine and marijuana user PMH CAD (s/p PCI 9/19), DM, HTN, HLD who presented originally to [facility] ED a few weeks ago with retrosternal chest pain where he ruled in for NSTEMI and states he received " two stents". He states he was supposed to follow up for surgery evaluation but his insurance lapsed and there were scheduling problems due to this issue. A few days ago he was walking in the park and felt severe chest pain while walking. Made it home and had breakfast but the chest pain became 8/10 retrosternal pressure/pain. EMS called and taken to [facility] ED where he ruled in for NSTEMI . Subsequent Cath showed 3 vessel CAD with EF 65%- ostial LAD 70%;pLAD 30%;mRCA 70%;Right posterior lateral segment 70%; patent stent in OMB. Pt was transferred here for surgical evaluation. On 10/15 patient planned for MIDCAB, however upon entering left chest patient went into V-tach during take down of left lung adhesions with monopolar cautery. Patient was shocked multiple times with chest compression in between to maintain CO. Total event lasted about 5 minutes and he was transferred to the CTICU intubated, on primacor and levophed.  While in the ICU the patient had no further arrhythmias and was extubated without complication.  On 10/16/19 he had an unevenful OPCAB x 2 LIMA-LAD and SVG-PDA and arrived back to the CTICU HD stable.  He was extubated POD 1 to HFNC.  He was noted to have airleak from his pleural chest tube and failed clamp trial on POD 1.  POD 2 he was weaned from HFNC, delined, and transferred to stepdown floor.      Neurovascular: No delirium. Pain well controlled with current regimen.  -con't tylenol and percocet prn     Cardiovascular: HD stable, HR controlled  -con't ASA/plavix and statin     -con't labetalol 300 mg TID, avoiding lopressor 2/2 hx of frequent cocaine use.   -lasix 20 mg QD     Respiratory: saturating well on 4 L NC  -If on oxygen wean to RA from for O2 Sat > 93%.  -Encourage C+DB and Use of IS 10x / hr while awake.  -left pleural CT with airleak post op, failed clamp trial POD 1.  Today, stable left apical PTX with CT clamped.  If CXR in  am stable, d/c CT.      GI: Stable.  -PPX.  -PO Diet.  -bowel regimen    Renal / : making adequate urine  -Monitor renal function while diuresing  -Monitor I/O's.  -replete electrolytes prn     Endocrine:    -con't mISS    Hematologic:  -HSQ for DVT ppx    ID:  -Observe for SIRS/Sepsis Syndrome.    Disposition:  -stepped down to telemetry 55 y/o male current cocaine and marijuana user PMH CAD (s/p PCI 9/19), DM, HTN, HLD who presented originally to [facility] ED a few weeks ago with retrosternal chest pain where he ruled in for NSTEMI and states he received " two stents". He states he was supposed to follow up for surgery evaluation but his insurance lapsed and there were scheduling problems due to this issue. A few days ago he was walking in the park and felt severe chest pain while walking. Made it home and had breakfast but the chest pain became 8/10 retrosternal pressure/pain. EMS called and taken to [facility] ED where he ruled in for NSTEMI . Subsequent Cath showed 3 vessel CAD with EF 65%- ostial LAD 70%;pLAD 30%;mRCA 70%;Right posterior lateral segment 70%; patent stent in OMB. Pt was transferred here for surgical evaluation. On 10/15 patient planned for MIDCAB, however upon entering left chest patient went into V-tach during take down of left lung adhesions with monopolar cautery. Patient was shocked multiple times with chest compression in between to maintain CO. Total event lasted about 5 minutes and he was transferred to the CTICU intubated, on primacor and levophed.  While in the ICU the patient had no further arrhythmias and was extubated without complication.  On 10/16/19 he had an unevenful OPCAB x 2 LIMA-LAD and SVG-PDA and arrived back to the CTICU HD stable.  He was extubated POD 1 to HFNC.  He was noted to have airleak from his pleural chest tube and failed clamp trial on POD 1.  POD 2 he was weaned from HFNC, delined, and transferred to stepdown floor.      Neurovascular: No delirium. Pain well controlled with current regimen.  -con't tylenol and percocet prn     Cardiovascular: HD stable, HR controlled  -con't ASA/plavix and statin     -con't labetalol 300 mg TID, avoiding lopressor 2/2 hx of frequent cocaine use.   -lasix 20 mg QD     Respiratory: saturating well on 4 L NC  -If on oxygen wean to RA from for O2 Sat > 93%.  -Encourage C+DB and Use of IS 10x / hr while awake.  -left pleural CT with airleak post op, failed clamp trial POD 1.  Today, stable left apical PTX slightly bigger with clamp. Will keep on H2O seal and monitor CXr.       GI: Stable.  -PPX.  -PO Diet.  -bowel regimen    Renal / : making adequate urine  -Monitor renal function while diuresing, stable  -Monitor I/O's.  -replete electrolytes prn     Endocrine:   -HgA1c 6.o  -con't ISS    Hematologic:  -HSQ for DVT ppx    ID:  -Observe for SIRS/Sepsis Syndrome.    Disposition:  -Discharge home possibly Monday

## 2019-10-19 NOTE — PROGRESS NOTE ADULT - SUBJECTIVE AND OBJECTIVE BOX
Patient discussed on morning rounds with       Operation / Date:     SUBJECTIVE ASSESSMENT:  54y Male         Vital Signs Last 24 Hrs  T(C): 36.6 (19 Oct 2019 13:21), Max: 38 (18 Oct 2019 21:31)  T(F): 97.9 (19 Oct 2019 13:21), Max: 100.4 (18 Oct 2019 21:31)  HR: 74 (19 Oct 2019 12:32) (70 - 86)  BP: 118/60 (19 Oct 2019 12:32) (91/65 - 136/71)  BP(mean): 80 (19 Oct 2019 12:32) (70 - 99)  RR: 18 (19 Oct 2019 12:32) (15 - 22)  SpO2: 93% (19 Oct 2019 12:32) (92% - 94%)  I&O's Detail    18 Oct 2019 07:01  -  19 Oct 2019 07:00  --------------------------------------------------------  IN:    sodium chloride 0.9%: 60 mL  Total IN: 60 mL    OUT:    Chest Tube: 10 mL    Voided: 300 mL    Voided: 1725 mL  Total OUT: 2035 mL    Total NET: -1975 mL      19 Oct 2019 07:01  -  19 Oct 2019 15:28  --------------------------------------------------------  IN:    Oral Fluid: 836 mL  Total IN: 836 mL    OUT:    Chest Tube: 130 mL    Voided: 300 mL  Total OUT: 430 mL    Total NET: 406 mL          CHEST TUBE:  Yes/No. AIR LEAKS: Yes/No. Suction / H2O SEAL.   YOSELYN DRAIN:  Yes/No.  EPICARDIAL WIRES: Yes/No.  TIE DOWNS: Yes/No.  SAGASTUME: Yes/No.    PHYSICAL EXAM:    General:     Neurological:    Cardiovascular:    Respiratory:    Gastrointestinal:    Extremities:    Vascular:    Incision Sites:    LABS:                        10.2   8.75  )-----------( 142      ( 19 Oct 2019 05:55 )             31.5       COUMADIN:  Yes/No. REASON: .    PT/INR - ( 18 Oct 2019 12:39 )   PT: 14.4 sec;   INR: 1.27          PTT - ( 18 Oct 2019 12:39 )  PTT:29.4 sec    10-19    137  |  101  |  19  ----------------------------<  130<H>  4.2   |  27  |  0.94    Ca    8.9      19 Oct 2019 05:55  Phos  4.1     10-18  Mg     1.8     10-19    TPro  6.7  /  Alb  3.6  /  TBili  0.5  /  DBili  x   /  AST  89<H>  /  ALT  122<H>  /  AlkPhos  194<H>  10-18          MEDICATIONS  (STANDING):  artificial  tears Solution 1 Drop(s) Both EYES every 6 hours  aspirin enteric coated 81 milliGRAM(s) Oral daily  atorvastatin 40 milliGRAM(s) Oral at bedtime  buDESOnide    Inhalation Suspension 0.25 milliGRAM(s) Inhalation two times a day  clopidogrel Tablet 75 milliGRAM(s) Oral daily  dextrose 5%. 1000 milliLiter(s) (50 mL/Hr) IV Continuous <Continuous>  dextrose 50% Injectable 12.5 Gram(s) IV Push once  dextrose 50% Injectable 25 Gram(s) IV Push once  dextrose 50% Injectable 25 Gram(s) IV Push once  erythromycin   Ointment 1 Application(s) Right EYE two times a day  furosemide    Tablet 20 milliGRAM(s) Oral daily  heparin  Injectable 5000 Unit(s) SubCutaneous every 8 hours  insulin lispro (HumaLOG) corrective regimen sliding scale   SubCutaneous Before meals and at bedtime  labetalol 300 milliGRAM(s) Oral every 8 hours  ofloxacin 0.3% Solution 1 Drop(s) Right EYE four times a day  pantoprazole    Tablet 40 milliGRAM(s) Oral before breakfast  polyethylene glycol 3350 17 Gram(s) Oral daily  potassium chloride    Tablet ER 10 milliEquivalent(s) Oral daily  sodium chloride 0.9% lock flush 3 milliLiter(s) IV Push every 8 hours    MEDICATIONS  (PRN):  acetaminophen   Tablet .. 650 milliGRAM(s) Oral every 6 hours PRN Mild Pain (1 - 3)  dextrose 40% Gel 15 Gram(s) Oral once PRN Blood Glucose LESS THAN 70 milliGRAM(s)/deciliter  glucagon  Injectable 1 milliGRAM(s) IntraMuscular once PRN Glucose LESS THAN 70 milligrams/deciliter  oxyCODONE    5 mG/acetaminophen 325 mG 1 Tablet(s) Oral every 4 hours PRN Moderate Pain (4 - 6)  oxyCODONE    5 mG/acetaminophen 325 mG 2 Tablet(s) Oral every 6 hours PRN Severe Pain (7 - 10)        RADIOLOGY & ADDITIONAL TESTS: Patient discussed on morning rounds with Dr. Edwards    Operation / Date: OPCAB 10/16    SUBJECTIVE ASSESSMENT:  54y Male doing well      Vital Signs Last 24 Hrs  T(C): 36.6 (19 Oct 2019 13:21), Max: 38 (18 Oct 2019 21:31)  T(F): 97.9 (19 Oct 2019 13:21), Max: 100.4 (18 Oct 2019 21:31)  HR: 74 (19 Oct 2019 12:32) (70 - 86)  BP: 118/60 (19 Oct 2019 12:32) (91/65 - 136/71)  BP(mean): 80 (19 Oct 2019 12:32) (70 - 99) SR  RR: 18 (19 Oct 2019 12:32) (15 - 22)  SpO2: 93% (19 Oct 2019 12:32) (92% - 94%) 4 L NC  I&O's Detail    18 Oct 2019 07:01  -  19 Oct 2019 07:00  --------------------------------------------------------  IN:    sodium chloride 0.9%: 60 mL  Total IN: 60 mL    OUT:    Chest Tube: 10 mL    Voided: 300 mL    Voided: 1725 mL  Total OUT: 2035 mL    Total NET: -1975 mL      19 Oct 2019 07:01  -  19 Oct 2019 15:28  --------------------------------------------------------  IN:    Oral Fluid: 836 mL  Total IN: 836 mL    OUT:    Chest Tube: 130 mL    Voided: 300 mL  Total OUT: 430 mL    Total NET: 406 mL      CHEST TUBE:  No  TIE DOWNS: No.  SAGASTUME: No.    PHYSICAL EXAM:    General: NAD    Neurological: non focal    Cardiovascular: RRR, no m/r/g    Respiratory: Bibasilar crackles    Gastrointestinal: + BS, soft non tender     Extremities: warm no edema    Incision Sites: c/d/i      LABS:                        10.2   8.75  )-----------( 142      ( 19 Oct 2019 05:55 )             31.5       COUMADIN:  Yes/No. REASON: .    PT/INR - ( 18 Oct 2019 12:39 )   PT: 14.4 sec;   INR: 1.27          PTT - ( 18 Oct 2019 12:39 )  PTT:29.4 sec    10-19    137  |  101  |  19  ----------------------------<  130<H>  4.2   |  27  |  0.94    Ca    8.9      19 Oct 2019 05:55  Phos  4.1     10-18  Mg     1.8     10-19    TPro  6.7  /  Alb  3.6  /  TBili  0.5  /  DBili  x   /  AST  89<H>  /  ALT  122<H>  /  AlkPhos  194<H>  10-18          MEDICATIONS  (STANDING):  artificial  tears Solution 1 Drop(s) Both EYES every 6 hours  aspirin enteric coated 81 milliGRAM(s) Oral daily  atorvastatin 40 milliGRAM(s) Oral at bedtime  buDESOnide    Inhalation Suspension 0.25 milliGRAM(s) Inhalation two times a day  clopidogrel Tablet 75 milliGRAM(s) Oral daily  erythromycin   Ointment 1 Application(s) Right EYE two times a day  furosemide    Tablet 20 milliGRAM(s) Oral daily  heparin  Injectable 5000 Unit(s) SubCutaneous every 8 hours  insulin lispro (HumaLOG) corrective regimen sliding scale   SubCutaneous Before meals and at bedtime  labetalol 300 milliGRAM(s) Oral every 8 hours  ofloxacin 0.3% Solution 1 Drop(s) Right EYE four times a day  pantoprazole    Tablet 40 milliGRAM(s) Oral before breakfast  polyethylene glycol 3350 17 Gram(s) Oral daily  potassium chloride    Tablet ER 10 milliEquivalent(s) Oral daily  sodium chloride 0.9% lock flush 3 milliLiter(s) IV Push every 8 hours    MEDICATIONS  (PRN):  acetaminophen   Tablet .. 650 milliGRAM(s) Oral every 6 hours PRN Mild Pain (1 - 3)  oxyCODONE    5 mG/acetaminophen 325 mG 1 Tablet(s) Oral every 4 hours PRN Moderate Pain (4 - 6)  oxyCODONE    5 mG/acetaminophen 325 mG 2 Tablet(s) Oral every 6 hours PRN Severe Pain (7 - 10)

## 2019-10-20 DIAGNOSIS — S05.01XA INJURY OF CONJUNCTIVA AND CORNEAL ABRASION WITHOUT FOREIGN BODY, RIGHT EYE, INITIAL ENCOUNTER: ICD-10-CM

## 2019-10-20 DIAGNOSIS — H04.123 DRY EYE SYNDROME OF BILATERAL LACRIMAL GLANDS: ICD-10-CM

## 2019-10-20 DIAGNOSIS — H35.61 RETINAL HEMORRHAGE, RIGHT EYE: ICD-10-CM

## 2019-10-20 DIAGNOSIS — H35.032 HYPERTENSIVE RETINOPATHY, LEFT EYE: ICD-10-CM

## 2019-10-20 LAB
ANION GAP SERPL CALC-SCNC: 13 MMOL/L — SIGNIFICANT CHANGE UP (ref 5–17)
BUN SERPL-MCNC: 19 MG/DL — SIGNIFICANT CHANGE UP (ref 7–23)
CALCIUM SERPL-MCNC: 8.8 MG/DL — SIGNIFICANT CHANGE UP (ref 8.4–10.5)
CHLORIDE SERPL-SCNC: 102 MMOL/L — SIGNIFICANT CHANGE UP (ref 96–108)
CO2 SERPL-SCNC: 25 MMOL/L — SIGNIFICANT CHANGE UP (ref 22–31)
CREAT SERPL-MCNC: 0.94 MG/DL — SIGNIFICANT CHANGE UP (ref 0.5–1.3)
GLUCOSE BLDC GLUCOMTR-MCNC: 114 MG/DL — HIGH (ref 70–99)
GLUCOSE BLDC GLUCOMTR-MCNC: 122 MG/DL — HIGH (ref 70–99)
GLUCOSE BLDC GLUCOMTR-MCNC: 144 MG/DL — HIGH (ref 70–99)
GLUCOSE BLDC GLUCOMTR-MCNC: 159 MG/DL — HIGH (ref 70–99)
GLUCOSE SERPL-MCNC: 138 MG/DL — HIGH (ref 70–99)
HCT VFR BLD CALC: 30.6 % — LOW (ref 39–50)
HGB BLD-MCNC: 9.9 G/DL — LOW (ref 13–17)
MAGNESIUM SERPL-MCNC: 1.8 MG/DL — SIGNIFICANT CHANGE UP (ref 1.6–2.6)
MCHC RBC-ENTMCNC: 30.9 PG — SIGNIFICANT CHANGE UP (ref 27–34)
MCHC RBC-ENTMCNC: 32.4 GM/DL — SIGNIFICANT CHANGE UP (ref 32–36)
MCV RBC AUTO: 95.6 FL — SIGNIFICANT CHANGE UP (ref 80–100)
NRBC # BLD: 0 /100 WBCS — SIGNIFICANT CHANGE UP (ref 0–0)
PLATELET # BLD AUTO: 157 K/UL — SIGNIFICANT CHANGE UP (ref 150–400)
POTASSIUM SERPL-MCNC: 4 MMOL/L — SIGNIFICANT CHANGE UP (ref 3.5–5.3)
POTASSIUM SERPL-SCNC: 4 MMOL/L — SIGNIFICANT CHANGE UP (ref 3.5–5.3)
RBC # BLD: 3.2 M/UL — LOW (ref 4.2–5.8)
RBC # FLD: 12.4 % — SIGNIFICANT CHANGE UP (ref 10.3–14.5)
SODIUM SERPL-SCNC: 140 MMOL/L — SIGNIFICANT CHANGE UP (ref 135–145)
WBC # BLD: 7.42 K/UL — SIGNIFICANT CHANGE UP (ref 3.8–10.5)
WBC # FLD AUTO: 7.42 K/UL — SIGNIFICANT CHANGE UP (ref 3.8–10.5)

## 2019-10-20 PROCEDURE — 71045 X-RAY EXAM CHEST 1 VIEW: CPT | Mod: 26

## 2019-10-20 RX ORDER — KETOROLAC TROMETHAMINE 0.5 %
1 DROPS OPHTHALMIC (EYE)
Refills: 0 | Status: DISCONTINUED | OUTPATIENT
Start: 2019-10-20 | End: 2019-10-21

## 2019-10-20 RX ORDER — MAGNESIUM OXIDE 400 MG ORAL TABLET 241.3 MG
800 TABLET ORAL DAILY
Refills: 0 | Status: DISCONTINUED | OUTPATIENT
Start: 2019-10-20 | End: 2019-10-22

## 2019-10-20 RX ORDER — POTASSIUM CHLORIDE 20 MEQ
20 PACKET (EA) ORAL ONCE
Refills: 0 | Status: COMPLETED | OUTPATIENT
Start: 2019-10-20 | End: 2019-10-20

## 2019-10-20 RX ORDER — INSULIN GLARGINE 100 [IU]/ML
8 INJECTION, SOLUTION SUBCUTANEOUS AT BEDTIME
Refills: 0 | Status: DISCONTINUED | OUTPATIENT
Start: 2019-10-20 | End: 2019-10-22

## 2019-10-20 RX ORDER — OXYCODONE AND ACETAMINOPHEN 5; 325 MG/1; MG/1
2 TABLET ORAL EVERY 6 HOURS
Refills: 0 | Status: DISCONTINUED | OUTPATIENT
Start: 2019-10-20 | End: 2019-10-22

## 2019-10-20 RX ORDER — OXYCODONE AND ACETAMINOPHEN 5; 325 MG/1; MG/1
1 TABLET ORAL EVERY 6 HOURS
Refills: 0 | Status: DISCONTINUED | OUTPATIENT
Start: 2019-10-20 | End: 2019-10-22

## 2019-10-20 RX ORDER — INSULIN GLARGINE 100 [IU]/ML
10 INJECTION, SOLUTION SUBCUTANEOUS AT BEDTIME
Refills: 0 | Status: DISCONTINUED | OUTPATIENT
Start: 2019-10-20 | End: 2019-10-20

## 2019-10-20 RX ADMIN — Medication 1 DROP(S): at 17:22

## 2019-10-20 RX ADMIN — PANTOPRAZOLE SODIUM 40 MILLIGRAM(S): 20 TABLET, DELAYED RELEASE ORAL at 05:02

## 2019-10-20 RX ADMIN — POLYETHYLENE GLYCOL 3350 17 GRAM(S): 17 POWDER, FOR SOLUTION ORAL at 12:38

## 2019-10-20 RX ADMIN — Medication 1 DROP(S): at 05:19

## 2019-10-20 RX ADMIN — OXYCODONE AND ACETAMINOPHEN 2 TABLET(S): 5; 325 TABLET ORAL at 18:22

## 2019-10-20 RX ADMIN — OXYCODONE AND ACETAMINOPHEN 2 TABLET(S): 5; 325 TABLET ORAL at 11:44

## 2019-10-20 RX ADMIN — OXYCODONE AND ACETAMINOPHEN 2 TABLET(S): 5; 325 TABLET ORAL at 23:45

## 2019-10-20 RX ADMIN — HEPARIN SODIUM 5000 UNIT(S): 5000 INJECTION INTRAVENOUS; SUBCUTANEOUS at 21:46

## 2019-10-20 RX ADMIN — MAGNESIUM OXIDE 400 MG ORAL TABLET 800 MILLIGRAM(S): 241.3 TABLET ORAL at 12:38

## 2019-10-20 RX ADMIN — Medication 300 MILLIGRAM(S): at 21:46

## 2019-10-20 RX ADMIN — Medication 1 DROP(S): at 12:37

## 2019-10-20 RX ADMIN — OXYCODONE AND ACETAMINOPHEN 2 TABLET(S): 5; 325 TABLET ORAL at 15:11

## 2019-10-20 RX ADMIN — SODIUM CHLORIDE 3 MILLILITER(S): 9 INJECTION INTRAMUSCULAR; INTRAVENOUS; SUBCUTANEOUS at 05:18

## 2019-10-20 RX ADMIN — Medication 300 MILLIGRAM(S): at 15:11

## 2019-10-20 RX ADMIN — INSULIN GLARGINE 8 UNIT(S): 100 INJECTION, SOLUTION SUBCUTANEOUS at 21:46

## 2019-10-20 RX ADMIN — OXYCODONE AND ACETAMINOPHEN 2 TABLET(S): 5; 325 TABLET ORAL at 05:03

## 2019-10-20 RX ADMIN — Medication 1 APPLICATION(S): at 17:24

## 2019-10-20 RX ADMIN — SODIUM CHLORIDE 3 MILLILITER(S): 9 INJECTION INTRAMUSCULAR; INTRAVENOUS; SUBCUTANEOUS at 15:09

## 2019-10-20 RX ADMIN — Medication 0.25 MILLIGRAM(S): at 09:26

## 2019-10-20 RX ADMIN — Medication 2: at 21:46

## 2019-10-20 RX ADMIN — SODIUM CHLORIDE 3 MILLILITER(S): 9 INJECTION INTRAMUSCULAR; INTRAVENOUS; SUBCUTANEOUS at 21:32

## 2019-10-20 RX ADMIN — Medication 10 MILLIEQUIVALENT(S): at 12:38

## 2019-10-20 RX ADMIN — Medication 20 MILLIGRAM(S): at 05:02

## 2019-10-20 RX ADMIN — Medication 1 DROP(S): at 09:27

## 2019-10-20 RX ADMIN — Medication 1 APPLICATION(S): at 05:03

## 2019-10-20 RX ADMIN — HEPARIN SODIUM 5000 UNIT(S): 5000 INJECTION INTRAVENOUS; SUBCUTANEOUS at 15:08

## 2019-10-20 RX ADMIN — Medication 81 MILLIGRAM(S): at 12:37

## 2019-10-20 RX ADMIN — Medication 0.25 MILLIGRAM(S): at 21:46

## 2019-10-20 RX ADMIN — OXYCODONE AND ACETAMINOPHEN 2 TABLET(S): 5; 325 TABLET ORAL at 05:45

## 2019-10-20 RX ADMIN — ATORVASTATIN CALCIUM 40 MILLIGRAM(S): 80 TABLET, FILM COATED ORAL at 21:46

## 2019-10-20 RX ADMIN — OXYCODONE AND ACETAMINOPHEN 2 TABLET(S): 5; 325 TABLET ORAL at 17:22

## 2019-10-20 RX ADMIN — Medication 300 MILLIGRAM(S): at 05:03

## 2019-10-20 RX ADMIN — HEPARIN SODIUM 5000 UNIT(S): 5000 INJECTION INTRAVENOUS; SUBCUTANEOUS at 05:02

## 2019-10-20 RX ADMIN — CLOPIDOGREL BISULFATE 75 MILLIGRAM(S): 75 TABLET, FILM COATED ORAL at 12:38

## 2019-10-20 RX ADMIN — Medication 20 MILLIEQUIVALENT(S): at 12:37

## 2019-10-20 NOTE — CONSULT NOTE ADULT - PROBLEM SELECTOR RECOMMENDATION 2
Likely secondary to recent CPR/coughing.   RD precautions given.   Upon discharge recommend to follow up with ophthalmology for further evaluation and management.

## 2019-10-20 NOTE — CONSULT NOTE ADULT - PROBLEM SELECTOR RECOMMENDATION 9
Improving. Secondary to recent surgery.   Erythromycin oint was placed into the right eye and the eye was pressure patched for 24 hrs. Please remove the patch tomorrow.   May resume Ofloxacin QID OD and Erythromycin ophthalmic oint BID OD, once the patch is off tomorrow.     Stop Ketorolac.

## 2019-10-20 NOTE — CONSULT NOTE ADULT - PROBLEM SELECTOR RECOMMENDATION 4
Recommend to use artificial tears QID OS prn.   Resume Artificial Tears QID OD prn once the patch is off tomorrow.

## 2019-10-20 NOTE — CONSULT NOTE ADULT - SUBJECTIVE AND OBJECTIVE BOX
53 y/o M PMHx of CAD (s/p PCI 9/19), DM, HTN, HLD and illicit drug user (cocaine and marijuana) and s/p cardiothoracic surgery, POD4 is c/o right eye pain and FBS since the surgery. Reports 30% improvement in symptoms after starting the ointment and eyedrops. Pt also reports 2 spots in his vision since the surgery. Denies acute changes in vision, diplopia, flashing light. Denies prior eye surgeries, laser procedures or trauma.     Hospital course: on 10/15 Midcab was aborted 2/2 to intraop VF arrest requiring multiple shocks, CPR (cautery induced). He was extubated and rested and following day (10/16) underwent OPCAB x 2 LIMA-LAD and SVG-PDA. Extubated POD 1.     NVAsc: OD 20/30-1, OS 20/30-2    Pupils: round and reactive OU, no APD  EOMs: full OU  CVF: full OU    External exam with 20D lens:  LLL: normal ocular adnexa OU, no ptosis OU  C/S: injection OU  K: SPK OD>OS, small epithelial corneal defect OD  A/C: formed OU  Iris: round and reactive, flat OU    Krunal: OD 11, OS 12    Dilated Fundus Examination  Lens: 2+NS and cortical changes OU  Vitreous: clear OU  C/D: sharp and pink OU; peripapillary CWS OS  Vessels: normal course and caliber OU  Macula: 2 retinal hemorrhages OD; flat, no heme OS  Periphery: intact 360, no tears/holes OU

## 2019-10-20 NOTE — PROGRESS NOTE ADULT - SUBJECTIVE AND OBJECTIVE BOX
Patient discussed on morning rounds with       Operation / Date:     SUBJECTIVE ASSESSMENT:  54y Male         Vital Signs Last 24 Hrs  T(C): 37.1 (20 Oct 2019 14:00), Max: 37.3 (19 Oct 2019 21:52)  T(F): 98.7 (20 Oct 2019 14:00), Max: 99.2 (20 Oct 2019 01:01)  HR: 72 (20 Oct 2019 15:16) (72 - 80)  BP: 136/74 (20 Oct 2019 15:16) (117/73 - 148/72)  BP(mean): 99 (20 Oct 2019 15:16) (86 - 99)  RR: 18 (20 Oct 2019 15:16) (14 - 20)  SpO2: 94% (20 Oct 2019 15:16) (91% - 97%)  I&O's Detail    19 Oct 2019 07:01  -  20 Oct 2019 07:00  --------------------------------------------------------  IN:    Oral Fluid: 836 mL  Total IN: 836 mL    OUT:    Chest Tube: 235 mL    Voided: 950 mL  Total OUT: 1185 mL    Total NET: -349 mL      20 Oct 2019 07:01  -  20 Oct 2019 16:23  --------------------------------------------------------  IN:    Oral Fluid: 740 mL  Total IN: 740 mL    OUT:    Chest Tube: 25 mL    Voided: 1100 mL  Total OUT: 1125 mL    Total NET: -385 mL          CHEST TUBE:  Yes/No. AIR LEAKS: Yes/No. Suction / H2O SEAL.   YOSELYN DRAIN:  Yes/No.  EPICARDIAL WIRES: Yes/No.  TIE DOWNS: Yes/No.  SAGASTUME: Yes/No.    PHYSICAL EXAM:    General:     Neurological:    Cardiovascular:    Respiratory:    Gastrointestinal:    Extremities:    Vascular:    Incision Sites:    LABS:                        9.9    7.42  )-----------( 157      ( 20 Oct 2019 07:07 )             30.6       COUMADIN:  Yes/No. REASON: .        10-20    140  |  102  |  19  ----------------------------<  138<H>  4.0   |  25  |  0.94    Ca    8.8      20 Oct 2019 07:07  Mg     1.8     10-20      MEDICATIONS  (STANDING):  artificial  tears Solution 1 Drop(s) Both EYES every 6 hours  aspirin enteric coated 81 milliGRAM(s) Oral daily  atorvastatin 40 milliGRAM(s) Oral at bedtime  buDESOnide    Inhalation Suspension 0.25 milliGRAM(s) Inhalation two times a day  clopidogrel Tablet 75 milliGRAM(s) Oral daily  erythromycin   Ointment 1 Application(s) Right EYE two times a day  furosemide    Tablet 20 milliGRAM(s) Oral daily  heparin  Injectable 5000 Unit(s) SubCutaneous every 8 hours  insulin lispro (HumaLOG) corrective regimen sliding scale   SubCutaneous Before meals and at bedtime  labetalol 300 milliGRAM(s) Oral every 8 hours  magnesium oxide 800 milliGRAM(s) Oral daily  ofloxacin 0.3% Solution 1 Drop(s) Right EYE four times a day  pantoprazole    Tablet 40 milliGRAM(s) Oral before breakfast  polyethylene glycol 3350 17 Gram(s) Oral daily  potassium chloride    Tablet ER 10 milliEquivalent(s) Oral daily  sodium chloride 0.9% lock flush 3 milliLiter(s) IV Push every 8 hours    MEDICATIONS  (PRN):  acetaminophen   Tablet .. 650 milliGRAM(s) Oral every 6 hours PRN Mild Pain (1 - 3)  ketorolac 0.5% Ophthalmic Solution 1 Drop(s) Right EYE four times a day PRN Eye irritation  oxyCODONE    5 mG/acetaminophen 325 mG 1 Tablet(s) Oral every 6 hours PRN Moderate Pain (4 - 6)  oxyCODONE    5 mG/acetaminophen 325 mG 2 Tablet(s) Oral every 6 hours PRN Severe Pain (7 - 10) Patient discussed on morning rounds with Dr. Edwards    Operation / Date: OPCAB 10/16    SUBJECTIVE ASSESSMENT:  54y Male doing well, c/o increased right eye pain, irritation.       Vital Signs Last 24 Hrs  T(C): 37.1 (20 Oct 2019 14:00), Max: 37.3 (19 Oct 2019 21:52)  T(F): 98.7 (20 Oct 2019 14:00), Max: 99.2 (20 Oct 2019 01:01)  HR: 72 (20 Oct 2019 15:16) (72 - 80) SR  BP: 136/74 (20 Oct 2019 15:16) (117/73 - 148/72)  BP(mean): 99 (20 Oct 2019 15:16) (86 - 99)  RR: 18 (20 Oct 2019 15:16) (14 - 20)  SpO2: 94% (20 Oct 2019 15:16) (91% - 97%) RA  I&O's Detail    19 Oct 2019 07:01  -  20 Oct 2019 07:00  --------------------------------------------------------  IN:    Oral Fluid: 836 mL  Total IN: 836 mL    OUT:    Chest Tube: 235 mL    Voided: 950 mL  Total OUT: 1185 mL    Total NET: -349 mL      20 Oct 2019 07:01  -  20 Oct 2019 16:23  --------------------------------------------------------  IN:    Oral Fluid: 740 mL  Total IN: 740 mL    OUT:    Chest Tube: 25 mL    Voided: 1100 mL  Total OUT: 1125 mL    Total NET: -385 mL          CHEST TUBE:  No  TIE DOWNS: No.  SAGASTUME: No.    PHYSICAL EXAM:    General: NAD    Neurological: non focal    Cardiovascular: RRR, no m/r/g    Respiratory: Bibasilar crackles    Gastrointestinal: + BS, soft non tender     Extremities: warm no edema    Incision Sites: c/d/i    LABS:                        9.9    7.42  )-----------( 157      ( 20 Oct 2019 07:07 )             30.6       COUMADIN:  Yes/No. REASON: .        10-20    140  |  102  |  19  ----------------------------<  138<H>  4.0   |  25  |  0.94    Ca    8.8      20 Oct 2019 07:07  Mg     1.8     10-20      MEDICATIONS  (STANDING):  artificial  tears Solution 1 Drop(s) Both EYES every 6 hours  aspirin enteric coated 81 milliGRAM(s) Oral daily  atorvastatin 40 milliGRAM(s) Oral at bedtime  buDESOnide    Inhalation Suspension 0.25 milliGRAM(s) Inhalation two times a day  clopidogrel Tablet 75 milliGRAM(s) Oral daily  erythromycin   Ointment 1 Application(s) Right EYE two times a day  furosemide    Tablet 20 milliGRAM(s) Oral daily  heparin  Injectable 5000 Unit(s) SubCutaneous every 8 hours  insulin lispro (HumaLOG) corrective regimen sliding scale   SubCutaneous Before meals and at bedtime  labetalol 300 milliGRAM(s) Oral every 8 hours  magnesium oxide 800 milliGRAM(s) Oral daily  ofloxacin 0.3% Solution 1 Drop(s) Right EYE four times a day  pantoprazole    Tablet 40 milliGRAM(s) Oral before breakfast  polyethylene glycol 3350 17 Gram(s) Oral daily  potassium chloride    Tablet ER 10 milliEquivalent(s) Oral daily  sodium chloride 0.9% lock flush 3 milliLiter(s) IV Push every 8 hours    MEDICATIONS  (PRN):  acetaminophen   Tablet .. 650 milliGRAM(s) Oral every 6 hours PRN Mild Pain (1 - 3)  ketorolac 0.5% Ophthalmic Solution 1 Drop(s) Right EYE four times a day PRN Eye irritation  oxyCODONE    5 mG/acetaminophen 325 mG 1 Tablet(s) Oral every 6 hours PRN Moderate Pain (4 - 6)  oxyCODONE    5 mG/acetaminophen 325 mG 2 Tablet(s) Oral every 6 hours PRN Severe Pain (7 - 10) Patient discussed on morning rounds with Dr. Edwards    Operation / Date: OPCAB 10/16    SUBJECTIVE ASSESSMENT:  54y Male doing well, c/o increased right eye pain, irritation.       Vital Signs Last 24 Hrs  T(C): 37.1 (20 Oct 2019 14:00), Max: 37.3 (19 Oct 2019 21:52)  T(F): 98.7 (20 Oct 2019 14:00), Max: 99.2 (20 Oct 2019 01:01)  HR: 72 (20 Oct 2019 15:16) (72 - 80) SR  BP: 136/74 (20 Oct 2019 15:16) (117/73 - 148/72)  BP(mean): 99 (20 Oct 2019 15:16) (86 - 99)  RR: 18 (20 Oct 2019 15:16) (14 - 20)  SpO2: 94% (20 Oct 2019 15:16) (91% - 97%) RA  I&O's Detail    19 Oct 2019 07:01  -  20 Oct 2019 07:00  --------------------------------------------------------  IN:    Oral Fluid: 836 mL  Total IN: 836 mL    OUT:    Chest Tube: 235 mL    Voided: 950 mL  Total OUT: 1185 mL    Total NET: -349 mL      20 Oct 2019 07:01  -  20 Oct 2019 16:23  --------------------------------------------------------  IN:    Oral Fluid: 740 mL  Total IN: 740 mL    OUT:    Chest Tube: 25 mL    Voided: 1100 mL  Total OUT: 1125 mL    Total NET: -385 mL          CHEST TUBE:  No  TIE DOWNS: No.  SAGASTUME: No.    PHYSICAL EXAM:    General: NAD    Neurological: non focal    Cardiovascular: RRR, no m/r/g    Respiratory: Bibasilar crackles    Gastrointestinal: + BS, soft non tender     Extremities: warm no edema    Incision Sites: c/d/i    LABS:                        9.9    7.42  )-----------( 157      ( 20 Oct 2019 07:07 )             30.6       10-20    140  |  102  |  19  ----------------------------<  138<H>  4.0   |  25  |  0.94    Ca    8.8      20 Oct 2019 07:07  Mg     1.8     10-20      MEDICATIONS  (STANDING):  artificial  tears Solution 1 Drop(s) Both EYES every 6 hours  aspirin enteric coated 81 milliGRAM(s) Oral daily  atorvastatin 40 milliGRAM(s) Oral at bedtime  buDESOnide    Inhalation Suspension 0.25 milliGRAM(s) Inhalation two times a day  clopidogrel Tablet 75 milliGRAM(s) Oral daily  erythromycin   Ointment 1 Application(s) Right EYE two times a day  furosemide    Tablet 20 milliGRAM(s) Oral daily  heparin  Injectable 5000 Unit(s) SubCutaneous every 8 hours  insulin lispro (HumaLOG) corrective regimen sliding scale   SubCutaneous Before meals and at bedtime  labetalol 300 milliGRAM(s) Oral every 8 hours  magnesium oxide 800 milliGRAM(s) Oral daily  ofloxacin 0.3% Solution 1 Drop(s) Right EYE four times a day  pantoprazole    Tablet 40 milliGRAM(s) Oral before breakfast  polyethylene glycol 3350 17 Gram(s) Oral daily  potassium chloride    Tablet ER 10 milliEquivalent(s) Oral daily  sodium chloride 0.9% lock flush 3 milliLiter(s) IV Push every 8 hours    MEDICATIONS  (PRN):  acetaminophen   Tablet .. 650 milliGRAM(s) Oral every 6 hours PRN Mild Pain (1 - 3)  ketorolac 0.5% Ophthalmic Solution 1 Drop(s) Right EYE four times a day PRN Eye irritation  oxyCODONE    5 mG/acetaminophen 325 mG 1 Tablet(s) Oral every 6 hours PRN Moderate Pain (4 - 6)  oxyCODONE    5 mG/acetaminophen 325 mG 2 Tablet(s) Oral every 6 hours PRN Severe Pain (7 - 10)

## 2019-10-20 NOTE — PROGRESS NOTE ADULT - ASSESSMENT
53 y/o male current cocaine and marijuana user [facility] CAD (s/p PCI 9/19), DM, HTN, HLD who presented originally to [facility] ED a few weeks prior to admission with retrosternal chest pain where he ruled in for NSTEMI and states he received " two stents". He states he was supposed to follow up for surgery evaluation but his insurance lapsed. A few days prior to admission he had CP on exertion, persisted with rest and EMS transported him to  ED where he ruled in for NSTEMI . Subsequent Cath showed severe CAD with patent OM stent and EF 65%. On 10/15 Midcab was aborted 2/2 to intraop VF arrest requiring multiple shocks, CPR (cautery induced). He was extubated and rested and following day (10/16) underwent OPCAB x 2 LIMA-LAD and SVG-PDA. Extubated POD 1.  He was noted to have airleak from his pleural chest tube and failed clamp trial on POD 1, 3, and 4.      Neurovascular: No delirium. Pain well controlled with current regimen.  -con't tylenol and percocet prn     Cardiovascular: HD stable, HR controlled  -70s SR, -130s, Labetalol 300 mg TID, avoiding lopressor 2/2 hx of frequent cocaine use.    ASA/plavix and statin       -lasix 20 mg QD     Respiratory: saturating well on 4 L NC  -If on oxygen wean to RA from for O2 Sat > 93%.  -Encourage C+DB and Use of IS 10x / hr while awake.  -left pleural CT with airleak post op, failed clamp trial POD 1.  Today, stable left apical PTX slightly bigger with clamp. Will keep on H2O seal and monitor CXr.       GI: Stable.  -PPX.  -PO Diet.  -bowel regimen    Renal / : making adequate urine  -Monitor renal function while diuresing, stable  -Monitor I/O's.  -replete electrolytes prn     Endocrine:   -HgA1c 6.o  -con't ISS    Hematologic:  -HSQ for DVT ppx    ID:  -Observe for SIRS/Sepsis Syndrome.    Disposition:  -Discharge home possibly Monday 55 y/o male current cocaine and marijuana user PMH CAD (s/p PCI 9/19), DM, HTN, HLD who presented originally to [facility] ED a few weeks prior to admission with retrosternal chest pain where he ruled in for NSTEMI and states he received " two stents". He states he was supposed to follow up for surgery evaluation but his insurance lapsed. A few days prior to admission he had CP on exertion, persisted with rest and EMS transported him to  ED where he ruled in for NSTEMI . Subsequent Cath showed severe CAD with patent OM stent and EF 65%. On 10/15 Midcab was aborted 2/2 to intraop VF arrest requiring multiple shocks, CPR (cautery induced). He was extubated and rested and following day (10/16) underwent OPCAB x 2 LIMA-LAD and SVG-PDA. Extubated POD 1.  He was noted to have airleak from his pleural chest tube and failed clamp trial on POD 1, 3, and 4.      Neurovascular: No delirium. Pain well controlled with current regimen.  -con't tylenol and percocet prn     Cardiovascular: HD stable, HR controlled  -70s SR, -130s, Labetalol 300 mg TID, avoiding lopressor 2/2 hx of frequent cocaine use.    ASA/plavix and statin       Respiratory: saturating well on RA  -Left pleural CT failed clamp trial POD 1, 3, 4.  Back to [facility] today, follow up am CXR.     -Encourage C+DB and Use of IS 10x / hr while awake.    GI: Stable.  -PPX.  -PO Diet.  -bowel regimen    Renal / : making adequate urine  -Lasix 20mg daily, replete electrolytes prn  -Monitor renal function while diuresing, stable  -Monitor I/O's.    Endocrine: DM2, HgA1c 6.2, on Lantus 10u QHS PRN at home  --140s.  -con't ISS, Lantus resumed at lower dose    Hematologic:  -HSQ for DVT ppx    Opthalmology:  -    Disposition:  -Discharge home possibly Monday 55 y/o male current cocaine and marijuana user PMH CAD (s/p PCI 9/19), DM, HTN, HLD who presented originally to [facility] ED a few weeks prior to admission with retrosternal chest pain where he ruled in for NSTEMI and states he received " two stents". He states he was supposed to follow up for surgery evaluation but his insurance lapsed. A few days prior to admission he had CP on exertion, persisted with rest and EMS transported him to  ED where he ruled in for NSTEMI . Subsequent Cath showed severe CAD with patent OM stent and EF 65%. On 10/15 Midcab was aborted 2/2 to intraop VF arrest requiring multiple shocks, CPR (cautery induced). He was extubated and rested and following day (10/16) underwent OPCAB x 2 LIMA-LAD and SVG-PDA. Extubated POD 1.  He was noted to have airleak from his pleural chest tube and failed clamp trial on POD 1, 3, and 4.      Neurovascular: No delirium. Pain well controlled with current regimen.  -con't tylenol and percocet prn     Cardiovascular: HD stable, HR controlled  -70s SR, -130s, Labetalol 300 mg TID, avoiding lopressor 2/2 hx of frequent cocaine use.    ASA/plavix and statin       Respiratory: saturating well on RA  -Left pleural CT failed clamp trial POD 1, 3, 4.  Back to [facility] today, follow up am CXR.     -Encourage C+DB and Use of IS 10x / hr while awake.    GI: Stable.  -PPX.  -PO Diet.  -bowel regimen    Renal / : making adequate urine  -Lasix 20mg daily, replete electrolytes prn  -Monitor renal function while diuresing, stable  -Monitor I/O's.    Endocrine: DM2, HgA1c 6.2, on Lantus 10u QHS PRN at home  --140s.  -con't ISS, Lantus resumed at lower dose    Hematologic:  -HSQ for DVT ppx    Opthalmology:  -Right eye redness, irritation differential includes corneal abrasion vs. foreign body. Opthalmology consulted, waiting on Rec's.   -Con't Artificial tears, Erythromycin oitment, Oflaxacin, and PRN Ketorac drops.     Disposition:  -Discharge home on CT out

## 2019-10-21 LAB
ANION GAP SERPL CALC-SCNC: 12 MMOL/L — SIGNIFICANT CHANGE UP (ref 5–17)
BUN SERPL-MCNC: 23 MG/DL — SIGNIFICANT CHANGE UP (ref 7–23)
CALCIUM SERPL-MCNC: 9.1 MG/DL — SIGNIFICANT CHANGE UP (ref 8.4–10.5)
CHLORIDE SERPL-SCNC: 104 MMOL/L — SIGNIFICANT CHANGE UP (ref 96–108)
CO2 SERPL-SCNC: 27 MMOL/L — SIGNIFICANT CHANGE UP (ref 22–31)
CREAT SERPL-MCNC: 0.92 MG/DL — SIGNIFICANT CHANGE UP (ref 0.5–1.3)
GLUCOSE BLDC GLUCOMTR-MCNC: 120 MG/DL — HIGH (ref 70–99)
GLUCOSE BLDC GLUCOMTR-MCNC: 124 MG/DL — HIGH (ref 70–99)
GLUCOSE BLDC GLUCOMTR-MCNC: 168 MG/DL — HIGH (ref 70–99)
GLUCOSE BLDC GLUCOMTR-MCNC: 188 MG/DL — HIGH (ref 70–99)
GLUCOSE SERPL-MCNC: 115 MG/DL — HIGH (ref 70–99)
HCT VFR BLD CALC: 32.5 % — LOW (ref 39–50)
HGB BLD-MCNC: 10.4 G/DL — LOW (ref 13–17)
MAGNESIUM SERPL-MCNC: 1.9 MG/DL — SIGNIFICANT CHANGE UP (ref 1.6–2.6)
MCHC RBC-ENTMCNC: 30.4 PG — SIGNIFICANT CHANGE UP (ref 27–34)
MCHC RBC-ENTMCNC: 32 GM/DL — SIGNIFICANT CHANGE UP (ref 32–36)
MCV RBC AUTO: 95 FL — SIGNIFICANT CHANGE UP (ref 80–100)
NRBC # BLD: 0 /100 WBCS — SIGNIFICANT CHANGE UP (ref 0–0)
PLATELET # BLD AUTO: 177 K/UL — SIGNIFICANT CHANGE UP (ref 150–400)
POTASSIUM SERPL-MCNC: 4.3 MMOL/L — SIGNIFICANT CHANGE UP (ref 3.5–5.3)
POTASSIUM SERPL-SCNC: 4.3 MMOL/L — SIGNIFICANT CHANGE UP (ref 3.5–5.3)
RBC # BLD: 3.42 M/UL — LOW (ref 4.2–5.8)
RBC # FLD: 12.4 % — SIGNIFICANT CHANGE UP (ref 10.3–14.5)
SODIUM SERPL-SCNC: 143 MMOL/L — SIGNIFICANT CHANGE UP (ref 135–145)
WBC # BLD: 7.16 K/UL — SIGNIFICANT CHANGE UP (ref 3.8–10.5)
WBC # FLD AUTO: 7.16 K/UL — SIGNIFICANT CHANGE UP (ref 3.8–10.5)

## 2019-10-21 PROCEDURE — 71045 X-RAY EXAM CHEST 1 VIEW: CPT | Mod: 26,76

## 2019-10-21 RX ORDER — MAGNESIUM OXIDE 400 MG ORAL TABLET 241.3 MG
800 TABLET ORAL ONCE
Refills: 0 | Status: COMPLETED | OUTPATIENT
Start: 2019-10-21 | End: 2019-10-21

## 2019-10-21 RX ADMIN — Medication 1 APPLICATION(S): at 17:39

## 2019-10-21 RX ADMIN — CLOPIDOGREL BISULFATE 75 MILLIGRAM(S): 75 TABLET, FILM COATED ORAL at 12:18

## 2019-10-21 RX ADMIN — POLYETHYLENE GLYCOL 3350 17 GRAM(S): 17 POWDER, FOR SOLUTION ORAL at 12:19

## 2019-10-21 RX ADMIN — Medication 0.25 MILLIGRAM(S): at 12:49

## 2019-10-21 RX ADMIN — Medication 1 DROP(S): at 12:30

## 2019-10-21 RX ADMIN — OXYCODONE AND ACETAMINOPHEN 2 TABLET(S): 5; 325 TABLET ORAL at 00:25

## 2019-10-21 RX ADMIN — SODIUM CHLORIDE 3 MILLILITER(S): 9 INJECTION INTRAMUSCULAR; INTRAVENOUS; SUBCUTANEOUS at 21:46

## 2019-10-21 RX ADMIN — SODIUM CHLORIDE 3 MILLILITER(S): 9 INJECTION INTRAMUSCULAR; INTRAVENOUS; SUBCUTANEOUS at 12:30

## 2019-10-21 RX ADMIN — ATORVASTATIN CALCIUM 40 MILLIGRAM(S): 80 TABLET, FILM COATED ORAL at 21:44

## 2019-10-21 RX ADMIN — Medication 300 MILLIGRAM(S): at 06:16

## 2019-10-21 RX ADMIN — Medication 1 DROP(S): at 17:39

## 2019-10-21 RX ADMIN — Medication 81 MILLIGRAM(S): at 12:17

## 2019-10-21 RX ADMIN — MAGNESIUM OXIDE 400 MG ORAL TABLET 400 MILLIGRAM(S): 241.3 TABLET ORAL at 12:17

## 2019-10-21 RX ADMIN — OXYCODONE AND ACETAMINOPHEN 2 TABLET(S): 5; 325 TABLET ORAL at 12:50

## 2019-10-21 RX ADMIN — HEPARIN SODIUM 5000 UNIT(S): 5000 INJECTION INTRAVENOUS; SUBCUTANEOUS at 21:44

## 2019-10-21 RX ADMIN — INSULIN GLARGINE 8 UNIT(S): 100 INJECTION, SOLUTION SUBCUTANEOUS at 21:45

## 2019-10-21 RX ADMIN — Medication 2: at 12:23

## 2019-10-21 RX ADMIN — HEPARIN SODIUM 5000 UNIT(S): 5000 INJECTION INTRAVENOUS; SUBCUTANEOUS at 15:33

## 2019-10-21 RX ADMIN — Medication 10 MILLIEQUIVALENT(S): at 12:17

## 2019-10-21 RX ADMIN — PANTOPRAZOLE SODIUM 40 MILLIGRAM(S): 20 TABLET, DELAYED RELEASE ORAL at 06:16

## 2019-10-21 RX ADMIN — MAGNESIUM OXIDE 400 MG ORAL TABLET 800 MILLIGRAM(S): 241.3 TABLET ORAL at 12:18

## 2019-10-21 RX ADMIN — OXYCODONE AND ACETAMINOPHEN 2 TABLET(S): 5; 325 TABLET ORAL at 07:00

## 2019-10-21 RX ADMIN — Medication 2: at 21:45

## 2019-10-21 RX ADMIN — OXYCODONE AND ACETAMINOPHEN 2 TABLET(S): 5; 325 TABLET ORAL at 12:17

## 2019-10-21 RX ADMIN — OXYCODONE AND ACETAMINOPHEN 2 TABLET(S): 5; 325 TABLET ORAL at 06:17

## 2019-10-21 RX ADMIN — Medication 300 MILLIGRAM(S): at 15:33

## 2019-10-21 RX ADMIN — Medication 300 MILLIGRAM(S): at 21:45

## 2019-10-21 RX ADMIN — Medication 0.25 MILLIGRAM(S): at 21:45

## 2019-10-21 RX ADMIN — HEPARIN SODIUM 5000 UNIT(S): 5000 INJECTION INTRAVENOUS; SUBCUTANEOUS at 06:16

## 2019-10-21 RX ADMIN — SODIUM CHLORIDE 3 MILLILITER(S): 9 INJECTION INTRAMUSCULAR; INTRAVENOUS; SUBCUTANEOUS at 06:23

## 2019-10-21 RX ADMIN — OXYCODONE AND ACETAMINOPHEN 2 TABLET(S): 5; 325 TABLET ORAL at 18:06

## 2019-10-21 RX ADMIN — Medication 20 MILLIGRAM(S): at 06:16

## 2019-10-21 NOTE — PROGRESS NOTE ADULT - SUBJECTIVE AND OBJECTIVE BOX
Patient discussed on morning rounds with Dr. Lugo    Operation / Date:  10/16/19 - OPCABG x 2 (lima-lad, svg-pda), EF 60%    SUBJECTIVE ASSESSMENT:  54y Male seen and examined bedside. Patient states he overall feels as if he is progressing. He states he is excited to have the eye patch removed. He is using IS, pulling 1250cc and states he is using it q6mins. He is ambulating in the halls on room air, and moving his bowels postoperatively. He is looking forward to having the chest tube removed and understands it is on clamp trial today. Denies chest pain, SOB, palpitations, hemopytsis, N/V/D, abdominal pain, dizziness, fever or chills.       Vital Signs Last 24 Hrs  T(C): 36.6 (21 Oct 2019 14:03), Max: 37.1 (20 Oct 2019 21:05)  T(F): 97.8 (21 Oct 2019 14:03), Max: 98.7 (20 Oct 2019 21:05)  HR: 68 (21 Oct 2019 14:02) (62 - 74)  BP: 122/78 (21 Oct 2019 14:02) (120/798 - 153/81)  BP(mean): 101 (21 Oct 2019 14:02) (89 - 106)  RR: 19 (21 Oct 2019 12:00) (18 - 22)  SpO2: 94% (21 Oct 2019 14:02) (94% - 95%)  I&O's Detail    20 Oct 2019 07:01  -  21 Oct 2019 07:00  --------------------------------------------------------  IN:    Oral Fluid: 1097 mL  Total IN: 1097 mL    OUT:    Chest Tube: 205 mL    Voided: 2800 mL  Total OUT: 3005 mL    Total NET: -1908 mL      21 Oct 2019 07:01  -  21 Oct 2019 14:21  --------------------------------------------------------  IN:  Total IN: 0 mL    OUT:    Chest Tube: 10 mL    Voided: 240 mL  Total OUT: 250 mL    Total NET: -250 mL          CHEST TUBE:  Yes. AIR LEAKS: No, clamped, on clamp trial this AM.    YOSELYN DRAIN: No.  EPICARDIAL WIRES: Yes.  TIE DOWNS: Yes  SAGASTUME:No.    PHYSICAL EXAM:    General: Patient OOBTC, no acute distress     Neurological: Alert and oriented. No focal neurological deficits     Cardiovascular: S1S2, RRR, no murmurs appreciated on exam     Respiratory: Clear to ausculation bilaterally, no w/r/r    Gastrointestinal: Abdomen soft, non tender, non distended     Extremities: Warm and well perfused. No peripheral edema or calf tenderness     Vascular: Peripheral pulses palpable bilaterally.     Incision Sites: MSI: c/d/i, no sternal click. Left chest port sites: c/d/i. 1 chest tube in place, connected securely to pleuravac.       LABS:                        10.4   7.16  )-----------( 177      ( 21 Oct 2019 06:21 )             32.5       COUMADIN:  No.     10-21    143  |  104  |  23  ----------------------------<  115<H>  4.3   |  27  |  0.92    Ca    9.1      21 Oct 2019 06:21  Mg     1.9     10-21            MEDICATIONS  (STANDING):  artificial  tears Solution 1 Drop(s) Both EYES every 6 hours  aspirin enteric coated 81 milliGRAM(s) Oral daily  atorvastatin 40 milliGRAM(s) Oral at bedtime  buDESOnide    Inhalation Suspension 0.25 milliGRAM(s) Inhalation two times a day  clopidogrel Tablet 75 milliGRAM(s) Oral daily  dextrose 5%. 1000 milliLiter(s) (50 mL/Hr) IV Continuous <Continuous>  dextrose 50% Injectable 12.5 Gram(s) IV Push once  dextrose 50% Injectable 25 Gram(s) IV Push once  dextrose 50% Injectable 25 Gram(s) IV Push once  erythromycin   Ointment 1 Application(s) Right EYE two times a day  furosemide    Tablet 20 milliGRAM(s) Oral daily  heparin  Injectable 5000 Unit(s) SubCutaneous every 8 hours  insulin glargine Injectable (LANTUS) 8 Unit(s) SubCutaneous at bedtime  insulin lispro (HumaLOG) corrective regimen sliding scale   SubCutaneous Before meals and at bedtime  labetalol 300 milliGRAM(s) Oral every 8 hours  magnesium oxide 800 milliGRAM(s) Oral daily  ofloxacin 0.3% Solution 1 Drop(s) Right EYE four times a day  pantoprazole    Tablet 40 milliGRAM(s) Oral before breakfast  polyethylene glycol 3350 17 Gram(s) Oral daily  potassium chloride    Tablet ER 10 milliEquivalent(s) Oral daily  sodium chloride 0.9% lock flush 3 milliLiter(s) IV Push every 8 hours    MEDICATIONS  (PRN):  acetaminophen   Tablet .. 650 milliGRAM(s) Oral every 6 hours PRN Mild Pain (1 - 3)  dextrose 40% Gel 15 Gram(s) Oral once PRN Blood Glucose LESS THAN 70 milliGRAM(s)/deciliter  glucagon  Injectable 1 milliGRAM(s) IntraMuscular once PRN Glucose LESS THAN 70 milligrams/deciliter  oxyCODONE    5 mG/acetaminophen 325 mG 1 Tablet(s) Oral every 6 hours PRN Moderate Pain (4 - 6)  oxyCODONE    5 mG/acetaminophen 325 mG 2 Tablet(s) Oral every 6 hours PRN Severe Pain (7 - 10)        RADIOLOGY & ADDITIONAL TESTS:    < from: Xray Chest 1 View- PORTABLE-Routine (10.20.19 @ 07:08) >  Impression: Progression left apical pneumothorax. Discoid changes noted   lung bases    < end of copied text >

## 2019-10-21 NOTE — CHART NOTE - NSCHARTNOTEFT_GEN_A_CORE
Admitting Diagnosis:   Patient is a 54y old  Male who presents with a chief complaint of CABG (18 Oct 2019 09:06)      PAST MEDICAL & SURGICAL HISTORY:  H/O unstable angina  DM (diabetes mellitus)  H/O non-ST elevation myocardial infarction (NSTEMI)  HTN (hypertension)  No significant past surgical history      Current Nutrition Order: Consistent Carb Diet no Snack + Low Na        PO Intake: Good (%) [ x  ]  Fair (50-75%) [   ] Poor (<25%) [   ]    GI Issues: constipation noted, no n/v/d     Pain: no pain noted     Skin Integrity: surgical incision, acosta 20     Labs:   10-21    143  |  104  |  23  ----------------------------<  115<H>  4.3   |  27  |  0.92    Ca    9.1      21 Oct 2019 06:21  Mg     1.9     10-21      CAPILLARY BLOOD GLUCOSE      POCT Blood Glucose.: 168 mg/dL (21 Oct 2019 11:44)  POCT Blood Glucose.: 120 mg/dL (21 Oct 2019 06:51)  POCT Blood Glucose.: 159 mg/dL (20 Oct 2019 21:30)  POCT Blood Glucose.: 114 mg/dL (20 Oct 2019 16:29)      Medications:  MEDICATIONS  (STANDING):  artificial  tears Solution 1 Drop(s) Both EYES every 6 hours  aspirin enteric coated 81 milliGRAM(s) Oral daily  atorvastatin 40 milliGRAM(s) Oral at bedtime  buDESOnide    Inhalation Suspension 0.25 milliGRAM(s) Inhalation two times a day  clopidogrel Tablet 75 milliGRAM(s) Oral daily  dextrose 5%. 1000 milliLiter(s) (50 mL/Hr) IV Continuous <Continuous>  dextrose 50% Injectable 12.5 Gram(s) IV Push once  dextrose 50% Injectable 25 Gram(s) IV Push once  dextrose 50% Injectable 25 Gram(s) IV Push once  erythromycin   Ointment 1 Application(s) Right EYE two times a day  furosemide    Tablet 20 milliGRAM(s) Oral daily  heparin  Injectable 5000 Unit(s) SubCutaneous every 8 hours  insulin glargine Injectable (LANTUS) 8 Unit(s) SubCutaneous at bedtime  insulin lispro (HumaLOG) corrective regimen sliding scale   SubCutaneous Before meals and at bedtime  labetalol 300 milliGRAM(s) Oral every 8 hours  magnesium oxide 800 milliGRAM(s) Oral daily  ofloxacin 0.3% Solution 1 Drop(s) Right EYE four times a day  pantoprazole    Tablet 40 milliGRAM(s) Oral before breakfast  polyethylene glycol 3350 17 Gram(s) Oral daily  potassium chloride    Tablet ER 10 milliEquivalent(s) Oral daily  sodium chloride 0.9% lock flush 3 milliLiter(s) IV Push every 8 hours    MEDICATIONS  (PRN):  acetaminophen   Tablet .. 650 milliGRAM(s) Oral every 6 hours PRN Mild Pain (1 - 3)  dextrose 40% Gel 15 Gram(s) Oral once PRN Blood Glucose LESS THAN 70 milliGRAM(s)/deciliter  glucagon  Injectable 1 milliGRAM(s) IntraMuscular once PRN Glucose LESS THAN 70 milligrams/deciliter  oxyCODONE    5 mG/acetaminophen 325 mG 1 Tablet(s) Oral every 6 hours PRN Moderate Pain (4 - 6)  oxyCODONE    5 mG/acetaminophen 325 mG 2 Tablet(s) Oral every 6 hours PRN Severe Pain (7 - 10)      Weight:   83kg (10/13)  85kg (10/14)     Weight Change: wt change in setting of fluid retention/ losses, please continue to trend    Nutrition Focused Physical Exam: Completed [   ]  Not Pertinent [ x  ]    Estimated energy needs:   ABW used for calculations as pt between % of IBW.   ABW 85kg, IBW 72kg, 118% IBW, ht 69", BMI 27.7   Nutrient needs based on [facility] standards of care for repletion in adults, adjusted for post-op needs, fluid per team  2125-2550kcal (25-30kcal/kg)   102-119g pro (1.2-1.4g/kg pro)     Subjective:   54M current cocaine and marijuana user PMH CAD (s/p PCI 9/19), DM, HTN, HLD who presented originally to [facility] ED a few weeks ago with retrosternal chest pain where he ruled in for NSTEMI and states he received " two stents" and was supposed to follow up for sx but had insurance issues, later developing 8/10 CP, admitted to [facility] for MIDCAB eval. Now POD6 CABG, moved to  for further management. Observed pt sitting up in chair, breakfast tray at bedside observed 100% eaten. Denies n/v/d + constipation noted. Continues to pend removal of CT with plan to DC home once stable. Pt in pleasant spirits stating he is feeling much improved. Will continue to follow per protocol.     Previous Nutrition Diagnosis: Increased nutrient needs r/t post-op needs AEB hypermetabolic state     Active [ x  ]  Resolved [   ]    If resolved, new PES:     Goal: meet >75% EER     Recommendations:  1. Encourage intake through day   2. Manage pain prn   3. Monitor and replete lytes prn   4. Trend wts  5. Reinforce ed prn     Education: reinforced DM diet     Risk Level: High [   ] Moderate [ x  ] Low [   ]

## 2019-10-21 NOTE — PROGRESS NOTE ADULT - ASSESSMENT
55 y/o male current cocaine and marijuana user PMH CAD (s/p PCI 9/19), DM, HTN, HLD who presented originally to [facility] ED a few weeks ago with retrosternal chest pain where he ruled in for NSTEMI and states he received " two stents". He states he was supposed to follow up for surgery evaluation but his insurance lapsed and there were scheduling problems due to this issue. A few days ago he was walking in the park and felt severe chest pain while walking. Made it home and had breakfast but the chest pain became 8/10 retrosternal pressure/pain. EMS called and taken to [facility] ED where he ruled in for NSTEMI . Subsequent Cath showed 3 vessel CAD with EF 65%- ostial LAD 70%;pLAD 30%;mRCA 70%; Right posterior lateral segment 70%; patent stent in OMB. Pt was transferred here for surgical evaluation. On 10/15 patient planned for MIDCAB, however upon entering left chest patient went into V-tach during take down of left lung adhesions with monopolar cautery. Patient was shocked multiple times with chest compression in between to maintain CO. Total event lasted about 5 minutes and he was transferred to the CTICU intubated, on primacor and levophed.  While in the ICU the patient had no further arrhythmias and was extubated without complication.  On 10/16/19 he had an uneventful OPCAB x 2 LIMA-LAD and SVG-PDA and arrived back to the CTICU HD stable.  He was extubated POD 1 to HFNC.  He was noted to have airleak from his pleural chest tube and failed clamp trial on POD 1.  POD 2 he was weaned from HFNC, delined, and transferred to stepdown floor.  He complained of right eye pain, ophthalmology consulted, and started on abx drops/oint, with improvement in symptoms.     Neurovascular: No delirium. Pain well controlled with current regimen.  - Continue percocet PRN for severe pain, and tylenol PRN for mild pain.     Cardiovascular: POD5 s/p OPCAB x2.  EF 60%.  - Continue Aspirin 81mg PO qd, plavix 75mg PO qd, Atorvastatin 40mg PO qhs, and labetolol 300mg PO TID.   - Continue avoiding lopressor 2/2 history of frequent cocaine abuse.   - Continue lasix 20mg PO qd.   - Continue to monitor HR/BP/Tele.     Respiratory: saturating 95% on RA  -If on oxygen wean to RA from for O2 Sat > 93%.  -Encourage C+DB and Use of IS 10x / hr while awake.  -left pleural CT with airleak post op, failed clamp trial POD 1.  Today, stable left apical PTX with CT clamped.  If CXR in am stable, d/c CT.      GI: Stable.  -PPX with pantoprazole.   -PO Diet.  -bowel regimen    Renal / : BUN/Cr: 23/0.92  -Monitor renal function while diuresing  -Monitor I/O's.  -replete electrolytes prn     Endocrine: stable.   - Continue ISS, Lantus 8U qhs, add nutritional as needed. Blood glucose goal <150  A1c: 6.2  TSH: 2.793    Hematologic: H&H: 10.4/32.5  - SQH for DVT ppx  - SCDs    HEENT: Ophthalmology following for right eye ?corneal abrasion   - Continue ofloxacin 4x/day in right eye, and erythromycin ointment 2x/day, as well as artifical tears.  - Eye patch removed today.   - Improving. F/u with Optho outpatient.     ID: WBC: 7.16  -Observe for SIRS/Sepsis Syndrome.    Disposition:  - Home when medically stable.

## 2019-10-22 ENCOUNTER — TRANSCRIPTION ENCOUNTER (OUTPATIENT)
Age: 54
End: 2019-10-22

## 2019-10-22 VITALS — TEMPERATURE: 99 F

## 2019-10-22 LAB
ANION GAP SERPL CALC-SCNC: 10 MMOL/L — SIGNIFICANT CHANGE UP (ref 5–17)
BUN SERPL-MCNC: 22 MG/DL — SIGNIFICANT CHANGE UP (ref 7–23)
CALCIUM SERPL-MCNC: 8.7 MG/DL — SIGNIFICANT CHANGE UP (ref 8.4–10.5)
CHLORIDE SERPL-SCNC: 104 MMOL/L — SIGNIFICANT CHANGE UP (ref 96–108)
CO2 SERPL-SCNC: 24 MMOL/L — SIGNIFICANT CHANGE UP (ref 22–31)
CREAT SERPL-MCNC: 0.9 MG/DL — SIGNIFICANT CHANGE UP (ref 0.5–1.3)
GLUCOSE BLDC GLUCOMTR-MCNC: 107 MG/DL — HIGH (ref 70–99)
GLUCOSE BLDC GLUCOMTR-MCNC: 121 MG/DL — HIGH (ref 70–99)
GLUCOSE SERPL-MCNC: 107 MG/DL — HIGH (ref 70–99)
HCT VFR BLD CALC: 29.3 % — LOW (ref 39–50)
HGB BLD-MCNC: 9.6 G/DL — LOW (ref 13–17)
MAGNESIUM SERPL-MCNC: 1.8 MG/DL — SIGNIFICANT CHANGE UP (ref 1.6–2.6)
MCHC RBC-ENTMCNC: 30.7 PG — SIGNIFICANT CHANGE UP (ref 27–34)
MCHC RBC-ENTMCNC: 32.8 GM/DL — SIGNIFICANT CHANGE UP (ref 32–36)
MCV RBC AUTO: 93.6 FL — SIGNIFICANT CHANGE UP (ref 80–100)
NRBC # BLD: 0 /100 WBCS — SIGNIFICANT CHANGE UP (ref 0–0)
PLATELET # BLD AUTO: 181 K/UL — SIGNIFICANT CHANGE UP (ref 150–400)
POTASSIUM SERPL-MCNC: 4.2 MMOL/L — SIGNIFICANT CHANGE UP (ref 3.5–5.3)
POTASSIUM SERPL-SCNC: 4.2 MMOL/L — SIGNIFICANT CHANGE UP (ref 3.5–5.3)
RBC # BLD: 3.13 M/UL — LOW (ref 4.2–5.8)
RBC # FLD: 12.2 % — SIGNIFICANT CHANGE UP (ref 10.3–14.5)
SODIUM SERPL-SCNC: 138 MMOL/L — SIGNIFICANT CHANGE UP (ref 135–145)
WBC # BLD: 6.97 K/UL — SIGNIFICANT CHANGE UP (ref 3.8–10.5)
WBC # FLD AUTO: 6.97 K/UL — SIGNIFICANT CHANGE UP (ref 3.8–10.5)

## 2019-10-22 PROCEDURE — 71045 X-RAY EXAM CHEST 1 VIEW: CPT | Mod: 26,77

## 2019-10-22 PROCEDURE — 71045 X-RAY EXAM CHEST 1 VIEW: CPT | Mod: 26

## 2019-10-22 RX ORDER — ERYTHROMYCIN BASE 5 MG/GRAM
1 OINTMENT (GRAM) OPHTHALMIC (EYE)
Qty: 1 | Refills: 0
Start: 2019-10-22 | End: 2019-10-24

## 2019-10-22 RX ORDER — ATORVASTATIN CALCIUM 80 MG/1
1 TABLET, FILM COATED ORAL
Qty: 30 | Refills: 0
Start: 2019-10-22 | End: 2019-11-20

## 2019-10-22 RX ORDER — CLOPIDOGREL BISULFATE 75 MG/1
1 TABLET, FILM COATED ORAL
Qty: 30 | Refills: 0
Start: 2019-10-22 | End: 2019-11-20

## 2019-10-22 RX ORDER — SENNA PLUS 8.6 MG/1
2 TABLET ORAL
Qty: 14 | Refills: 0
Start: 2019-10-22 | End: 2019-10-28

## 2019-10-22 RX ORDER — MAGNESIUM OXIDE 400 MG ORAL TABLET 241.3 MG
800 TABLET ORAL ONCE
Refills: 0 | Status: DISCONTINUED | OUTPATIENT
Start: 2019-10-22 | End: 2019-10-22

## 2019-10-22 RX ORDER — POTASSIUM CHLORIDE 20 MEQ
1 PACKET (EA) ORAL
Qty: 30 | Refills: 0
Start: 2019-10-22 | End: 2019-11-20

## 2019-10-22 RX ORDER — DOCUSATE SODIUM 100 MG
1 CAPSULE ORAL
Qty: 21 | Refills: 0
Start: 2019-10-22 | End: 2019-10-28

## 2019-10-22 RX ORDER — ACETAMINOPHEN 500 MG
2 TABLET ORAL
Qty: 0 | Refills: 0 | DISCHARGE
Start: 2019-10-22

## 2019-10-22 RX ORDER — OFLOXACIN 0.3 %
1 DROPS OPHTHALMIC (EYE)
Qty: 1 | Refills: 0
Start: 2019-10-22 | End: 2019-10-24

## 2019-10-22 RX ORDER — ASPIRIN/CALCIUM CARB/MAGNESIUM 324 MG
1 TABLET ORAL
Qty: 30 | Refills: 0
Start: 2019-10-22 | End: 2019-11-20

## 2019-10-22 RX ORDER — LABETALOL HCL 100 MG
1 TABLET ORAL
Qty: 90 | Refills: 0
Start: 2019-10-22 | End: 2019-11-20

## 2019-10-22 RX ORDER — PANTOPRAZOLE SODIUM 20 MG/1
1 TABLET, DELAYED RELEASE ORAL
Qty: 30 | Refills: 0
Start: 2019-10-22 | End: 2019-11-20

## 2019-10-22 RX ORDER — FUROSEMIDE 40 MG
1 TABLET ORAL
Qty: 30 | Refills: 0
Start: 2019-10-22 | End: 2019-11-20

## 2019-10-22 RX ADMIN — Medication 1 DROP(S): at 00:01

## 2019-10-22 RX ADMIN — Medication 300 MILLIGRAM(S): at 15:04

## 2019-10-22 RX ADMIN — Medication 1 DROP(S): at 15:04

## 2019-10-22 RX ADMIN — HEPARIN SODIUM 5000 UNIT(S): 5000 INJECTION INTRAVENOUS; SUBCUTANEOUS at 05:29

## 2019-10-22 RX ADMIN — Medication 1 DROP(S): at 05:25

## 2019-10-22 RX ADMIN — OXYCODONE AND ACETAMINOPHEN 2 TABLET(S): 5; 325 TABLET ORAL at 00:08

## 2019-10-22 RX ADMIN — OXYCODONE AND ACETAMINOPHEN 2 TABLET(S): 5; 325 TABLET ORAL at 06:15

## 2019-10-22 RX ADMIN — Medication 10 MILLIEQUIVALENT(S): at 12:30

## 2019-10-22 RX ADMIN — CLOPIDOGREL BISULFATE 75 MILLIGRAM(S): 75 TABLET, FILM COATED ORAL at 12:30

## 2019-10-22 RX ADMIN — OXYCODONE AND ACETAMINOPHEN 2 TABLET(S): 5; 325 TABLET ORAL at 06:45

## 2019-10-22 RX ADMIN — OXYCODONE AND ACETAMINOPHEN 2 TABLET(S): 5; 325 TABLET ORAL at 12:30

## 2019-10-22 RX ADMIN — Medication 20 MILLIGRAM(S): at 05:29

## 2019-10-22 RX ADMIN — PANTOPRAZOLE SODIUM 40 MILLIGRAM(S): 20 TABLET, DELAYED RELEASE ORAL at 06:16

## 2019-10-22 RX ADMIN — MAGNESIUM OXIDE 400 MG ORAL TABLET 800 MILLIGRAM(S): 241.3 TABLET ORAL at 12:30

## 2019-10-22 RX ADMIN — OXYCODONE AND ACETAMINOPHEN 2 TABLET(S): 5; 325 TABLET ORAL at 00:38

## 2019-10-22 RX ADMIN — Medication 1 APPLICATION(S): at 05:25

## 2019-10-22 RX ADMIN — SODIUM CHLORIDE 3 MILLILITER(S): 9 INJECTION INTRAMUSCULAR; INTRAVENOUS; SUBCUTANEOUS at 05:26

## 2019-10-22 RX ADMIN — Medication 300 MILLIGRAM(S): at 05:29

## 2019-10-22 RX ADMIN — Medication 81 MILLIGRAM(S): at 12:30

## 2019-10-22 RX ADMIN — SODIUM CHLORIDE 3 MILLILITER(S): 9 INJECTION INTRAMUSCULAR; INTRAVENOUS; SUBCUTANEOUS at 15:05

## 2019-10-22 NOTE — DISCHARGE NOTE PROVIDER - HOSPITAL COURSE
55 y/o male current cocaine and marijuana user, with PMHx of CAD (s/p PCI 9/2019), DM, HTN, HLD who presented originally to [facility] ED a few weeks ago with retrosternal chest pain where he ruled in for NSTEMI and states he received "two stents". He states he was supposed to follow up for surgical evaluation but his insurance lapsed and there were scheduling problems due to this issue. Patient developed severe chest pain while walking in the park, EMS was called and patient was taken to [facility] ED where he ruled in for NSTEMI. Subsequent Cath showed 3 vessel CAD with EF 65%- ostial LAD 70%;pLAD 30%;mRCA 70%; Right posterior lateral segment 70%; patent stent in OMB. Pt was transferred here to [facility] for surgical evaluation. On 10/15/19 patient planned for MIDCAB procedure, however upon entering left chest, patient went into V-tach arrest during take down of left lung adhesions with monopolar cautery. Patient was shocked multiple times with chest compressions in between to maintain cardiac output. Total event lasted about 5 minutes and he was transferred to the CTICU intubated, on primacor and levophed. While in the ICU the patient had no further arrhythmias and was extubated without complication.  On 10/16/19 patient was brought back to the OR and had an uneventful Off-Pump CABG x2 (LIMA-LAD and SVG-PDA) and patient arrived back to the CT ICU in stable condition.  He was extubated POD 1 to HFNC.  POD 2 he was weaned from HFNC, delined, and transferred to stepdown floor.  He complained of right eye pain, ophthalmology consulted, and started on abx drops/oint, with improvement in symptoms. Postoperatively, he was noted to have an air-leak from his left pleural chest tube and failed multiple clamp trials. On POD5, he had a successful clamp trial, with CXR revealing unchanged left apical space. Today is POD6, per Dr. Lugo, chest tube was removed at bedside, follow up CXR with noted left apical space remained stable on repeat CXRs post-tube removal. Patient is saturating well on room air and ambulating in the halls without difficulty. Per Dr. Lugo, patient deemed stable for discharge home today.     35 minutes was spent with the patient reviewing the discharge material including medications, follow up appointments, recovery, concerning symptoms, and how to contact their health care providers if they have questions

## 2019-10-22 NOTE — DISCHARGE NOTE PROVIDER - NSDCCPCAREPLAN_GEN_ALL_CORE_FT
PRINCIPAL DISCHARGE DIAGNOSIS  Diagnosis: Coronary artery disease involving native heart with unstable angina pectoris, unspecified vessel or lesion type  Assessment and Plan of Treatment: Coronary artery disease involving native heart with unstable angina pectoris, unspecified vessel or lesion type

## 2019-10-22 NOTE — PROGRESS NOTE ADULT - SUBJECTIVE AND OBJECTIVE BOX
Patient discussed on morning rounds with Dr. Lugo    Operation / Date: 10/16/19 - OPCABG x 2 (lima-lad, svg-pda), EF 60%    Surgeon: Dr. Lugo    Referring Physician: Dr. Mendoza    SUBJECTIVE ASSESSMENT   54y Male seen and examined bedside. Patient states he is excited to go home today. He is using IS, pulling 1250cc. Ambulating in the halls on room air, and moving his bowels postoperatively. He states his right eye pain is much better since the drops were started. He complains that he still feels his vision is blurry in the right eye, but feels it is slowly improving. Denies chest pain, SOB, palpitations, hemopytsis, N/V/D, abdominal pain, dizziness, fever or chills.     AND HOSPITAL COURSE:  53 y/o male current cocaine and marijuana user, with PMHx of CAD (s/p PCI 9/2019), DM, HTN, HLD who presented originally to [facility] ED a few weeks ago with retrosternal chest pain where he ruled in for NSTEMI and states he received "two stents". He states he was supposed to follow up for surgical evaluation but his insurance lapsed and there were scheduling problems due to this issue. Patient developed severe chest pain while walking in the park, EMS was called and patient was taken to [facility] ED where he ruled in for NSTEMI. Subsequent Cath showed 3 vessel CAD with EF 65%- ostial LAD 70%; pLAD 30%; mRCA 70%; Right posterior lateral segment 70%; patent stent in OMB. Pt was transferred here to [facility] for surgical evaluation. On 10/15/19 patient planned for MIDCAB procedure, however upon entering left chest, patient went into V-tach arrest during take down of left lung adhesions with monopolar cautery. Patient was shocked multiple times with chest compressions in between to maintain cardiac output. Total event lasted about 5 minutes and he was transferred to the CTICU intubated, on primacor and levophed. While in the ICU the patient had no further arrhythmias and was extubated without complication.  On 10/16/19 patient was brought back to the OR and had an uneventful Off-Pump CABG x2 (LIMA-LAD and SVG-PDA) and patient arrived back to the CT ICU in stable condition.  He was extubated POD 1 to HFNC.  POD 2 he was weaned from HFNC, delined, and transferred to stepdown floor.  He complained of right eye pain, ophthalmology consulted, and started on abx drops/oint, with improvement in symptoms. Postoperatively, he was noted to have an air-leak from his left pleural chest tube and failed multiple clamp trials. On POD5, he had a successful clamp trial, with CXR revealing unchanged left apical space. Today is POD6, per Dr. Lugo, chest tube was removed at bedside, follow up CXR with noted left apical space remained stable on repeat CXRs post-tube removal. Patient is saturating well on room air and ambulating in the halls without difficulty. Per Dr. Lugo, patient deemed stable for discharge home today.   35 minutes was spent with the patient reviewing the discharge material including medications, follow up appointments, recovery, concerning symptoms, and how to contact their health care providers if they have questions      Vital Signs Last 24 Hrs  T(C): 36.9 (22 Oct 2019 09:46), Max: 37.1 (22 Oct 2019 01:01)  T(F): 98.4 (22 Oct 2019 09:46), Max: 98.8 (22 Oct 2019 01:01)  HR: 66 (22 Oct 2019 10:11) (62 - 76)  BP: 133/78 (22 Oct 2019 10:11) (122/78 - 177/99)  BP(mean): 93 (22 Oct 2019 10:11) (88 - 123)  RR: 18 (22 Oct 2019 10:11) (17 - 20)  SpO2: 97% (22 Oct 2019 10:11) (93% - 97%)    EPICARDIAL WIRES REMOVED: Yes.  TIE DOWNS REMOVED: No*    PHYSICAL EXAM:    General: Patient lying comfortably in bed, no acute distress     Neurological: Alert and oriented. No focal neurological deficits     Cardiovascular: S1S2, RRR, no murmurs appreciated on exam     Respiratory: Clear to ausculation bilaterally, no w/r/r    Gastrointestinal: Abdomen soft, non tender, non distended, +bowel sounds    Extremities: Warm and well perfused. No [peripheral edema or calf tenderness     Vascular: Peripheral pulses palpable bilaterally.     Incision Sites: MSI: c/d/i, no sternal click. Left chest port sites: c/d/i.   Left chest tube site with +tiedown in place, covered with clean occlusive dressing.        LABS:                        9.6    6.97  )-----------( 181      ( 22 Oct 2019 06:38 )             29.3       COUMADIN:  No.           10-22    138  |  104  |  22  ----------------------------<  107<H>  4.2   |  24  |  0.90    Ca    8.7      22 Oct 2019 06:38  Mg     1.8     10-22            Discharge CXR: < from: Xray Chest 1 View- PORTABLE-Urgent (10.21.19 @ 13:51) >    EXAM:  XR CHEST PORTABLE URGENT 1V                          PROCEDURE DATE:  10/21/2019          INTERPRETATION:  XR CHEST URGENT dated 10/21/2019 1:51 PM    CLINICAL INFORMATION: s/p clamp trial    COMPARISON: October 21, 2019 at 0706 hours    FINDINGS: Heart size is stable. Left chest tube in place. Stable small   left apical pneumothorax. Mild left base atelectasis. No pleural effusion.    IMPRESSION: No change in small left apical pneumothorax with left chest   tube in place.    < end of copied text >      Discharge ECHO:    not indicated

## 2019-10-22 NOTE — DISCHARGE NOTE NURSING/CASE MANAGEMENT/SOCIAL WORK - PATIENT PORTAL LINK FT
You can access the FollowMyHealth Patient Portal offered by [facility] by registering at the following website: [email or web address]. By joining [employer]’s FollowMyHealth portal, you will also be able to view your health information using other applications (apps) compatible with our system.

## 2019-10-22 NOTE — DISCHARGE NOTE NURSING/CASE MANAGEMENT/SOCIAL WORK - NSDCPEWEB_GEN_ALL_CORE
[place] website --- [email or web address] website --- [email or web address]

## 2019-10-22 NOTE — DISCHARGE NOTE PROVIDER - CARE PROVIDERS DIRECT ADDRESSES
[email or web address]

## 2019-10-22 NOTE — DISCHARGE NOTE PROVIDER - NSDCFUADDAPPT_GEN_ALL_CORE_FT
-Please follow up with Dr. Lugo on 10/29/19 at 12:15PM.  The office is located at [facility], [facility], 4th floor. Call us with any questions #[phone].  - Please also follow up with referring MD, Dr. Matthieu Mendoza, within 2 weeks from the date of discharge, to guide your recovery from surgery.  - Opthalmology, Dr. Rose, has been seeing you while inpatient for your right eye pain. Please continue eye drops, and ointment, as prescribed. Please call Dr. Rose to schedule a follow up appointment within 1-2 weeks from the date of discharge.

## 2019-10-22 NOTE — PROGRESS NOTE ADULT - ASSESSMENT
-Please follow up with Dr. Lugo on 10/29/19 at 12:15PM.  The office is located at [facility], [facility], 4th floor. Call us with any questions #[phone].  - Please also follow up with referring MD, Dr. Matthieu Mendoza, within 2 weeks from the date of discharge, to guide your recovery from surgery.  - Opthalmology, Dr. Rose, has been seeing you while inpatient for your right eye pain. Please continue eye drops, and ointment, as prescribed. Please call Dr. Rose to schedule a follow up appointment within 1-2 weeks from the date of discharge.  -Walk daily as tolerated and use your incentive spirometer every hour.  -No driving or strenuous activity/exercise for 6 weeks, or until cleared by your surgeon.  -Gently clean your incisions with anti-bacterial soap and water, pat dry.  You may leave them open to air.  -Call your doctor if you have shortness of breath, chest pain not relieved by pain medication, dizziness, fever >101.5, or increased redness or drainage from incisions.

## 2019-10-22 NOTE — DISCHARGE NOTE PROVIDER - NSDCFUADDINST_GEN_ALL_CORE_FT
-Walk daily as tolerated and use your incentive spirometer every hour.  -No driving or strenuous activity/exercise for 6 weeks, or until cleared by your surgeon.  -Gently clean your incisions with anti-bacterial soap and water, pat dry.  You may leave them open to air.  -Call your doctor if you have shortness of breath, chest pain not relieved by pain medication, dizziness, fever >101.5, or increased redness or drainage from incisions.

## 2019-10-22 NOTE — DISCHARGE NOTE PROVIDER - CARE PROVIDER_API CALL
Melvin Lugo (MD)  Cardiovascular Surgery  [address], [address]th [place] [address]  Phone: [phone]  Fax: [phone]  Follow Up Time:     Bev Rose (DO)  [facility]  [address] [address]  Phone: [phone]  Fax: [phone]  Follow Up Time:     Matthieu Mendoza (MD)  Cardiovascular Disease; Interventional Cardiology  [address], [address] [place] [address]  Phone: [phone]  Fax: [phone]  Follow Up Time:

## 2019-10-22 NOTE — DISCHARGE NOTE NURSING/CASE MANAGEMENT/SOCIAL WORK - NSDCPEEMAIL_GEN_ALL_CORE
[facility] Tobacco Control email [email or web address]

## 2019-10-22 NOTE — CHART NOTE - NSCHARTNOTEFT_GEN_A_CORE
As per Dr. Lugo, epicardial pacing wires removed prior to discharge. Atrial and ventricular epicardial wires cleansed with chlorhexadine and cut at skin level. Patient tolerated procedure well and remained hemodynamically stable.  As per Dr. Lugo, chest tube removed at bedside without incident. U stitch tied down in place and occlusive dressing applied. Patient tolerated procedure well. Follow up CXR with noted apical space, appears unchanged from previous CXR. Will repeat CXR again this PM to monitor.

## 2019-10-22 NOTE — DISCHARGE NOTE PROVIDER - NSDCCPTREATMENT_GEN_ALL_CORE_FT
PRINCIPAL PROCEDURE  Procedure: CABG, with NISREEN  Findings and Treatment: op cabg x 3 (lima-lad, svg-pda) EF 60%

## 2019-10-22 NOTE — PROGRESS NOTE ADULT - PROVIDER SPECIALTY LIST ADULT
CT Surgery
Critical Care

## 2019-10-22 NOTE — DISCHARGE NOTE PROVIDER - PROVIDER TOKENS
PROVIDER:[TOKEN:[[id number]]],PROVIDER:[TOKEN:[[id number]]],PROVIDER:[TOKEN:[[id number]]]

## 2019-10-22 NOTE — DISCHARGE NOTE NURSING/CASE MANAGEMENT/SOCIAL WORK - NSDCFUADDAPPT_GEN_ALL_CORE_FT
-Please follow up with Dr. Lugo on 10/29/19 at 12:15PM.  The office is located at [facility], [facility], 4th floor. Call us with any questions #[phone].  - Please also follow up with referring MD, Dr. Matthieu Mendoza, within 2 weeks from the date of discharge, to guide your recovery from surgery.  - Opthalmology, Dr. Rose, has been seeing you while inpatient for your right eye pain. Please continue eye drops, and ointment, as prescribed. Please call Dr. Rose to schedule a follow up appointment within 1-2 weeks from the date of discharge.

## 2019-10-25 VITALS — WEIGHT: 187.39 LBS | HEIGHT: 68.9 IN | BODY MASS INDEX: 27.76 KG/M2

## 2019-10-25 PROBLEM — Z86.39 HISTORY OF HYPERLIPIDEMIA: Status: RESOLVED | Noted: 2019-10-25 | Resolved: 2019-10-25

## 2019-10-25 PROBLEM — Z86.39 HISTORY OF DIABETES MELLITUS: Status: RESOLVED | Noted: 2019-10-25 | Resolved: 2019-10-25

## 2019-10-25 PROBLEM — Z86.79 HISTORY OF HYPERTENSION: Status: RESOLVED | Noted: 2019-10-25 | Resolved: 2019-10-25

## 2019-10-25 PROBLEM — I21.4 NSTEMI, INITIAL EPISODE OF CARE: Status: RESOLVED | Noted: 2019-10-25 | Resolved: 2019-10-25

## 2019-10-25 PROBLEM — F17.200 CURRENT EVERY DAY SMOKER: Status: ACTIVE | Noted: 2019-10-25

## 2019-10-25 PROBLEM — Z95.828 PRESENCE OF STENT IN ARTERY: Status: RESOLVED | Noted: 2019-10-25 | Resolved: 2019-10-25

## 2019-10-25 RX ORDER — ERYTHROMYCIN 20 MG/ML
SOLUTION TOPICAL
Refills: 0 | Status: ACTIVE | COMMUNITY

## 2019-10-25 RX ORDER — ASPIRIN 81 MG
81 TABLET, DELAYED RELEASE (ENTERIC COATED) ORAL DAILY
Qty: 1 | Refills: 5 | Status: ACTIVE | COMMUNITY

## 2019-10-25 RX ORDER — OXYCODONE HYDROCHLORIDE AND ACETAMINOPHEN 5; 325 MG/1; MG/1
5-325 TABLET ORAL
Refills: 0 | Status: ACTIVE | COMMUNITY

## 2019-10-25 RX ORDER — OFLOXACIN OTIC 3 MG/ML
0.3 SOLUTION AURICULAR (OTIC)
Refills: 0 | Status: ACTIVE | COMMUNITY

## 2019-10-25 RX ORDER — FUROSEMIDE 20 MG/1
20 TABLET ORAL DAILY
Qty: 30 | Refills: 0 | Status: ACTIVE | COMMUNITY

## 2019-10-25 RX ORDER — PANTOPRAZOLE 40 MG/1
40 TABLET, DELAYED RELEASE ORAL DAILY
Refills: 5 | Status: ACTIVE | COMMUNITY

## 2019-10-25 RX ORDER — ATORVASTATIN CALCIUM 40 MG/1
40 TABLET, FILM COATED ORAL
Qty: 30 | Refills: 6 | Status: ACTIVE | COMMUNITY

## 2019-10-25 RX ORDER — LABETALOL HYDROCHLORIDE 300 MG/1
300 TABLET, FILM COATED ORAL EVERY 8 HOURS
Refills: 0 | Status: ACTIVE | COMMUNITY

## 2019-10-25 RX ORDER — NAPHAZOLINE HCL/PHENIRAMINE .0268-.315
DROPS OPHTHALMIC (EYE)
Refills: 0 | Status: ACTIVE | COMMUNITY

## 2019-10-25 RX ORDER — POTASSIUM CHLORIDE 750 MG/1
10 CAPSULE, EXTENDED RELEASE ORAL
Qty: 30 | Refills: 0 | Status: ACTIVE | COMMUNITY

## 2019-10-25 RX ORDER — CLOPIDOGREL 75 MG/1
75 TABLET, FILM COATED ORAL DAILY
Qty: 30 | Refills: 3 | Status: ACTIVE | COMMUNITY

## 2019-10-28 ENCOUNTER — FORM ENCOUNTER (OUTPATIENT)
Age: 54
End: 2019-10-28

## 2019-10-29 ENCOUNTER — APPOINTMENT (OUTPATIENT)
Dept: CARDIOTHORACIC SURGERY | Facility: CLINIC | Age: 54
End: 2019-10-29
Payer: MEDICAID

## 2019-10-29 ENCOUNTER — OUTPATIENT (OUTPATIENT)
Dept: OUTPATIENT SERVICES | Facility: HOSPITAL | Age: 54
LOS: 1 days | End: 2019-10-29
Payer: MEDICAID

## 2019-10-29 VITALS
HEART RATE: 68 BPM | SYSTOLIC BLOOD PRESSURE: 147 MMHG | WEIGHT: 189 LBS | RESPIRATION RATE: 17 BRPM | HEIGHT: 68 IN | BODY MASS INDEX: 28.64 KG/M2 | TEMPERATURE: 98.3 F | OXYGEN SATURATION: 97 % | DIASTOLIC BLOOD PRESSURE: 77 MMHG

## 2019-10-29 DIAGNOSIS — E78.5 HYPERLIPIDEMIA, UNSPECIFIED: ICD-10-CM

## 2019-10-29 DIAGNOSIS — Z86.79 PERSONAL HISTORY OF OTHER DISEASES OF THE CIRCULATORY SYSTEM: ICD-10-CM
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
## 2019-10-29 DIAGNOSIS — I49.01 VENTRICULAR FIBRILLATION: ICD-10-CM

## 2019-10-29 DIAGNOSIS — Z86.39 PERSONAL HISTORY OF OTHER ENDOCRINE, NUTRITIONAL AND METABOLIC DISEASE: ICD-10-CM

## 2019-10-29 DIAGNOSIS — Z95.5 PRESENCE OF CORONARY ANGIOPLASTY IMPLANT AND GRAFT: ICD-10-CM

## 2019-10-29 DIAGNOSIS — I10 ESSENTIAL (PRIMARY) HYPERTENSION: ICD-10-CM
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
## 2019-10-29 DIAGNOSIS — I21.4 NON-ST ELEVATION (NSTEMI) MYOCARDIAL INFARCTION: ICD-10-CM

## 2019-10-29 DIAGNOSIS — I25.2 OLD MYOCARDIAL INFARCTION: ICD-10-CM

## 2019-10-29 DIAGNOSIS — H35.032 HYPERTENSIVE RETINOPATHY, LEFT EYE: ICD-10-CM

## 2019-10-29 DIAGNOSIS — F17.200 NICOTINE DEPENDENCE, UNSPECIFIED, UNCOMPLICATED: ICD-10-CM

## 2019-10-29 DIAGNOSIS — J98.4 OTHER DISORDERS OF LUNG: ICD-10-CM

## 2019-10-29 DIAGNOSIS — T81.11XA POSTPROCEDURAL CARDIOGENIC SHOCK, INITIAL ENCOUNTER: ICD-10-CM

## 2019-10-29 DIAGNOSIS — I25.110 ATHEROSCLEROTIC HEART DISEASE OF NATIVE CORONARY ARTERY WITH UNSTABLE ANGINA PECTORIS: ICD-10-CM

## 2019-10-29 DIAGNOSIS — I22.2 SUBSEQUENT NON-ST ELEVATION (NSTEMI) MYOCARDIAL INFARCTION: ICD-10-CM

## 2019-10-29 DIAGNOSIS — J95.812 POSTPROCEDURAL AIR LEAK: ICD-10-CM

## 2019-10-29 DIAGNOSIS — I97.710 INTRAOPERATIVE CARDIAC ARREST DURING CARDIAC SURGERY: ICD-10-CM

## 2019-10-29 DIAGNOSIS — Z95.828 PRESENCE OF OTHER VASCULAR IMPLANTS AND GRAFTS: ICD-10-CM

## 2019-10-29 DIAGNOSIS — I31.0 CHRONIC ADHESIVE PERICARDITIS: ICD-10-CM

## 2019-10-29 DIAGNOSIS — F12.90 CANNABIS USE, UNSPECIFIED, UNCOMPLICATED: ICD-10-CM

## 2019-10-29 DIAGNOSIS — H04.123 DRY EYE SYNDROME OF BILATERAL LACRIMAL GLANDS: ICD-10-CM

## 2019-10-29 DIAGNOSIS — Z87.891 PERSONAL HISTORY OF NICOTINE DEPENDENCE: ICD-10-CM

## 2019-10-29 DIAGNOSIS — F14.90 COCAINE USE, UNSPECIFIED, UNCOMPLICATED: ICD-10-CM

## 2019-10-29 DIAGNOSIS — J95.811 POSTPROCEDURAL PNEUMOTHORAX: ICD-10-CM

## 2019-10-29 DIAGNOSIS — E10.9 TYPE 1 DIABETES MELLITUS WITHOUT COMPLICATIONS: ICD-10-CM

## 2019-10-29 DIAGNOSIS — S05.01XA INJURY OF CONJUNCTIVA AND CORNEAL ABRASION WITHOUT FOREIGN BODY, RIGHT EYE, INITIAL ENCOUNTER: ICD-10-CM

## 2019-10-29 PROCEDURE — 71046 X-RAY EXAM CHEST 2 VIEWS: CPT | Mod: 26

## 2019-10-29 PROCEDURE — 71046 X-RAY EXAM CHEST 2 VIEWS: CPT

## 2019-10-29 PROCEDURE — 99024 POSTOP FOLLOW-UP VISIT: CPT

## 2019-10-30 ENCOUNTER — FORM ENCOUNTER (OUTPATIENT)
Age: 54
End: 2019-10-30

## 2019-10-31 ENCOUNTER — OUTPATIENT (OUTPATIENT)
Dept: OUTPATIENT SERVICES | Facility: HOSPITAL | Age: 54
LOS: 1 days | End: 2019-10-31
Payer: COMMERCIAL

## 2019-10-31 ENCOUNTER — APPOINTMENT (OUTPATIENT)
Dept: CARE COORDINATION | Facility: HOME HEALTH | Age: 54
End: 2019-10-31
Payer: MEDICAID

## 2019-10-31 ENCOUNTER — APPOINTMENT (OUTPATIENT)
Dept: CARDIOTHORACIC SURGERY | Facility: CLINIC | Age: 54
End: 2019-10-31
Payer: MEDICAID

## 2019-10-31 VITALS
BODY MASS INDEX: 28.59 KG/M2 | OXYGEN SATURATION: 99 % | DIASTOLIC BLOOD PRESSURE: 89 MMHG | WEIGHT: 188 LBS | HEART RATE: 60 BPM | SYSTOLIC BLOOD PRESSURE: 160 MMHG | RESPIRATION RATE: 18 BRPM | TEMPERATURE: 97.6 F

## 2019-10-31 VITALS
RESPIRATION RATE: 18 BRPM | TEMPERATURE: 98.1 F | WEIGHT: 188 LBS | BODY MASS INDEX: 28.49 KG/M2 | DIASTOLIC BLOOD PRESSURE: 90 MMHG | OXYGEN SATURATION: 98 % | HEART RATE: 68 BPM | HEIGHT: 68 IN | SYSTOLIC BLOOD PRESSURE: 145 MMHG

## 2019-10-31 DIAGNOSIS — Z95.1 PRESENCE OF AORTOCORONARY BYPASS GRAFT: ICD-10-CM

## 2019-10-31 DIAGNOSIS — Z09 ENCOUNTER FOR FOLLOW-UP EXAMINATION AFTER COMPLETED TREATMENT FOR CONDITIONS OTHER THAN MALIGNANT NEOPLASM: ICD-10-CM

## 2019-10-31 DIAGNOSIS — I25.10 ATHEROSCLEROTIC HEART DISEASE OF NATIVE CORONARY ARTERY W/OUT ANGINA PECTORIS: ICD-10-CM

## 2019-10-31 PROCEDURE — 71046 X-RAY EXAM CHEST 2 VIEWS: CPT

## 2019-10-31 PROCEDURE — 99024 POSTOP FOLLOW-UP VISIT: CPT

## 2019-10-31 PROCEDURE — 71046 X-RAY EXAM CHEST 2 VIEWS: CPT | Mod: 26

## 2019-11-01 NOTE — REASON FOR VISIT
[Post Op: _________] : a [unfilled] post op visit [FreeTextEntry1] : CC: first post op home viist Mrs. Pa complained of having a "bad night'.  Pt states that he could not lay back due to Shortness of breath Pt is also experiencing SOB at rest

## 2019-11-01 NOTE — HISTORY OF PRESENT ILLNESS
[Diabetes Mellitus] : Diabetes Mellitus [Dyslipidemia] : Dyslipidemia [Hypertension] : Hypertension [FreeTextEntry1] : Mrs. Ibrahima Rodas is a 53 Y/O male patient of Dr. Lugo S/P OPCABG x 2 current cocaine and marijuana user, CAD (s/p PCI 9/19), DM2, HTN, HLD  originally to [facility] ED a few weeks prior to admission with retrosternal chest pain where he ruled in for NSTEMI and states he received " two stents". He states he was supposed to follow up for surgery evaluation but his insurance lapsed. A few days prior to admission he had CP on exertion, persisted with rest and EMS transported him to  ED where he ruled in for NSTEMI . Subsequent Cath showed severe CAD with patent OM stent and EF 65%. On 10/15 Midcab was aborted 2/2 to intraop VF arrest requiring multiple shocks, CPR (cautery induced). He was extubated and rested and following day (10/16) underwent OPCAB x 2 LIMA-LAD and SVG-PDA

## 2019-11-01 NOTE — PHYSICAL EXAM
[Sclera] : the sclera and conjunctiva were normal [Neck Appearance] : the appearance of the neck was normal [Apical Impulse] : the apical impulse was normal [Examination Of The Chest] : the chest was normal in appearance [Breast Appearance] : normal in appearance [Bowel Sounds] : normal bowel sounds [Abnormal Walk] : normal gait [Skin Color & Pigmentation] : normal skin color and pigmentation [FreeTextEntry1] : MSI C/DI/ CT CDI

## 2019-11-01 NOTE — ASSESSMENT
[FreeTextEntry1] : Mr. Pa met me in the lobby, patient appeared generally well, Pts vital signs  are stable pt complaining of new onset SOB at rest unrelieved.  Pt Denies SOB at rest while in the hospital or for the last few days.  Pateint Denies Pain.  \par \par Plan\par 1. Pt to report to CT surgery office for further testing. .

## 2019-11-01 NOTE — REVIEW OF SYSTEMS
[Shortness Of Breath] : shortness of breath [As Noted in HPI] : as noted in HPI [Negative] : Neurological [FreeTextEntry6] : at rest and minimal excertion

## 2019-11-07 RX ORDER — CLOPIDOGREL BISULFATE 75 MG/1
1 TABLET, FILM COATED ORAL
Qty: 0 | Refills: 0 | DISCHARGE

## 2019-11-07 RX ORDER — ASPIRIN/CALCIUM CARB/MAGNESIUM 324 MG
1 TABLET ORAL
Qty: 0 | Refills: 0 | DISCHARGE

## 2019-11-07 RX ORDER — LISINOPRIL 2.5 MG/1
1 TABLET ORAL
Qty: 0 | Refills: 0 | DISCHARGE

## 2019-11-07 RX ORDER — DILTIAZEM HCL 120 MG
0 CAPSULE, EXT RELEASE 24 HR ORAL
Qty: 0 | Refills: 0 | DISCHARGE

## 2019-11-07 RX ORDER — INSULIN GLARGINE 100 [IU]/ML
10 INJECTION, SOLUTION SUBCUTANEOUS
Qty: 0 | Refills: 0 | DISCHARGE

## 2019-11-07 RX ORDER — ATORVASTATIN CALCIUM 80 MG/1
1 TABLET, FILM COATED ORAL
Qty: 0 | Refills: 0 | DISCHARGE

## 2019-11-15 ENCOUNTER — APPOINTMENT (OUTPATIENT)
Age: 54
End: 2019-11-15

## 2019-11-26 ENCOUNTER — APPOINTMENT (OUTPATIENT)
Dept: CARDIOTHORACIC SURGERY | Facility: CLINIC | Age: 54
End: 2019-11-26

## 2021-08-04 NOTE — PATIENT PROFILE ADULT - NSTOBACCOCESSATIONEDU4_GEN_A_NUR
Smoking even a single puff increases the likelihood of a full relapse, withdrawal symptoms peak within 1-2 weeks, but can persist for months preop anxiety

## 2022-05-10 ENCOUNTER — EMERGENCY (EMERGENCY)
Facility: HOSPITAL | Age: 57
LOS: 1 days | Discharge: HOME | End: 2022-05-10
Attending: EMERGENCY MEDICINE | Admitting: EMERGENCY MEDICINE
Payer: MEDICAID

## 2022-05-10 VITALS
HEART RATE: 77 BPM | OXYGEN SATURATION: 100 % | DIASTOLIC BLOOD PRESSURE: 107 MMHG | TEMPERATURE: 99 F | RESPIRATION RATE: 20 BRPM | SYSTOLIC BLOOD PRESSURE: 188 MMHG

## 2022-05-10 VITALS
SYSTOLIC BLOOD PRESSURE: 179 MMHG | HEART RATE: 65 BPM | DIASTOLIC BLOOD PRESSURE: 98 MMHG | TEMPERATURE: 98 F | RESPIRATION RATE: 18 BRPM | OXYGEN SATURATION: 98 %

## 2022-05-10 PROCEDURE — 99284 EMERGENCY DEPT VISIT MOD MDM: CPT | Mod: 25

## 2022-05-10 PROCEDURE — 93010 ELECTROCARDIOGRAM REPORT: CPT

## 2022-05-10 RX ORDER — LISINOPRIL 2.5 MG/1
40 TABLET ORAL ONCE
Refills: 0 | Status: COMPLETED | OUTPATIENT
Start: 2022-05-10 | End: 2022-05-10

## 2022-05-10 RX ORDER — HYDROCHLOROTHIAZIDE 25 MG
25 TABLET ORAL ONCE
Refills: 0 | Status: COMPLETED | OUTPATIENT
Start: 2022-05-10 | End: 2022-05-10

## 2022-05-10 RX ADMIN — LISINOPRIL 40 MILLIGRAM(S): 2.5 TABLET ORAL at 17:04

## 2022-05-10 RX ADMIN — Medication 25 MILLIGRAM(S): at 17:04

## 2022-05-10 NOTE — ED PROVIDER NOTE - ATTENDING CONTRIBUTION TO CARE
Seen and examined, accompanied by PD, pt. under arrest, requested medical eval. due to elev. BP. Pt. has hx of HTN and takes meds, no meds since yest., able to report meds and doses. Denies CP/N/V/SOB/diaphoresis, no HA, no neck pain, no jaw pain, no back pain. Resp.unlabored lying flat, pt. states sleepy since hasn't slept overnight. Accompanying officer denies hearing pt. c/o of any symptoms en route. Sl. lethargic at time of initial eval. but arouses to verbal and appropriate to questioning. MMM, clear lungs, heart reg, abd soft, NT to palp, ext. no edema, NT calves, good pulses, ambulates in ED, NAD.

## 2022-05-10 NOTE — ED PROVIDER NOTE - NSFOLLOWUPINSTRUCTIONS_ED_ALL_ED_FT
You were seen in the ER for hypertension. You did not have any symptoms. You received your home hypertension medications.     Please follow up with your primary care doctor.    Please return to the ER if you have worsening symptoms including fever, chest pain, shortness of breath, tremors, sweating or lightheadedness.

## 2022-05-10 NOTE — ED PROVIDER NOTE - PATIENT PORTAL LINK FT
You can access the FollowMyHealth Patient Portal offered by [facility] by registering at the following website: [email or web address]. By joining [employer]’s FollowMyHealth portal, you will also be able to view your health information using other applications (apps) compatible with our system.

## 2022-05-10 NOTE — ED PROVIDER NOTE - PROGRESS NOTE DETAILS
Noe Castano- on reassessment, pt is reporting that he used "all the drugs" and alcohol. Last 12hr ago. Still reports no symptoms. clarified multiple times. Pt has no tongue fasciculations, no hand tremors. CIWA 0. pt still hypertensive however pt has history of htn. Noe Castano- on reassessment, pt is reporting that he used "all the drugs" and alcohol. Last 12hr ago. Still reports no symptoms. clarified multiple times. Pt has no tongue fasciculations, no hand tremors. CIWA 0. Pt still hypertensive however pt has history of htn, given daily meds in ED.

## 2022-05-10 NOTE — ED ADULT NURSE NOTE - OBJECTIVE STATEMENT
Pt brought by police for HTN,  in custody. Noted to be drowsy,  examined by MD, EKG done, medicated as per MD's order, continue to monitot.

## 2022-05-10 NOTE — ED PROVIDER NOTE - PHYSICAL EXAMINATION
General appearance: NAD, conversant, afebrile    Eyes: anicteric sclerae, LAILA, EOMI   HENT: Atraumatic; oropharynx clear, MMM and no ulcerations, no pharyngeal erythema or exudate   Neck: Trachea midline; Full range of motion, supple   Pulm: CTA bl, normal respiratory effort and no intercostal retractions, normal work of breathing   CV: RRR, No murmurs, rubs, or gallops.    Abdomen: Soft, non-tender, non-distended; no guarding or rebound   Extremities: No peripheral edema or extremity lymphadenopathy. 5/5 strength in all four extremities.   Skin: Dry, normal temperature, turgor and texture; no rash, ulcers or subcutaneous nodules   Psych: Appropriate affect, cooperative; alert and oriented to person, place and time

## 2022-05-10 NOTE — ED PROVIDER NOTE - OBJECTIVE STATEMENT
58yo M w/ hx htn, CAD presenting with hypertension. Coming from being under arrest, for medical eval for hypertension. Pt reports no symptoms. no cp, sob, n/v, abd pain, vision changes.

## 2022-05-10 NOTE — ED ADULT NURSE NOTE - CHIEF COMPLAINT QUOTE
Patient brought to ER by law enforcement from [employer] for elevated blood pressure.  NO OTHER COMPLAINTS.  type 2 DM

## 2022-05-10 NOTE — ED ADULT TRIAGE NOTE - CHIEF COMPLAINT QUOTE
Patient brought to ER by EMS from [employer] for elevated blood pressure.  nO OTHER COMPLAINTS.  type 2 DM Patient brought to ER by law enforcement from [employer] for elevated blood pressure.  NO OTHER COMPLAINTS.  type 2 DM

## 2022-05-10 NOTE — ED PROVIDER NOTE - CLINICAL SUMMARY MEDICAL DECISION MAKING FREE TEXT BOX
58yo M w/ hx htn, CAD presenting with asymptomatic hypertension. pt does not have any symptoms. physical exam unremarkable. will give home htn meds. likely discharge.

## 2022-09-07 PROBLEM — Z86.79 PERSONAL HISTORY OF OTHER DISEASES OF THE CIRCULATORY SYSTEM: Chronic | Status: ACTIVE | Noted: 2019-10-13

## 2022-09-07 PROBLEM — I25.2 OLD MYOCARDIAL INFARCTION: Chronic | Status: ACTIVE | Noted: 2019-10-13

## 2022-09-07 PROBLEM — I10 ESSENTIAL (PRIMARY) HYPERTENSION: Chronic | Status: ACTIVE | Noted: 2019-10-13

## 2022-09-07 PROBLEM — E11.9 TYPE 2 DIABETES MELLITUS WITHOUT COMPLICATIONS: Chronic | Status: ACTIVE | Noted: 2019-10-13

## 2023-04-24 ENCOUNTER — EMERGENCY (EMERGENCY)
Facility: HOSPITAL | Age: 58
LOS: 1 days | Discharge: HOME | End: 2023-04-24
Attending: EMERGENCY MEDICINE
Payer: MEDICAID

## 2023-04-24 VITALS
SYSTOLIC BLOOD PRESSURE: 166 MMHG | HEIGHT: 69 IN | HEART RATE: 68 BPM | TEMPERATURE: 98 F | RESPIRATION RATE: 16 BRPM | WEIGHT: 184.97 LBS | DIASTOLIC BLOOD PRESSURE: 87 MMHG | OXYGEN SATURATION: 98 %

## 2023-04-24 PROCEDURE — 99283 EMERGENCY DEPT VISIT LOW MDM: CPT

## 2023-04-24 PROCEDURE — 82962 GLUCOSE BLOOD TEST: CPT

## 2023-04-24 PROCEDURE — 99284 EMERGENCY DEPT VISIT MOD MDM: CPT

## 2023-04-24 RX ORDER — LISINOPRIL 2.5 MG/1
40 TABLET ORAL ONCE
Refills: 0 | Status: COMPLETED | OUTPATIENT
Start: 2023-04-24 | End: 2023-04-24

## 2023-04-24 RX ADMIN — LISINOPRIL 40 MILLIGRAM(S): 2.5 TABLET ORAL at 06:34

## 2023-04-24 NOTE — ED ADULT TRIAGE NOTE - WAS YOUR LAST COVID-19 VACCINE GREATER THAN OR EQUAL TO TWO MONTHS AGO?
Patient Seen in: [facility] AND [facility] Emergency Department    History   Patient presents with:  Stroke (neurologic)    Stated Complaint: weakness, blurred, hearing loss    HPI    75-year-old female with past medical history significant for diabetes, high bl noted above.     Physical Exam     ED Triage Vitals [07/24/19 1548]   BP (!) 192/54   Pulse 83   Resp 19   Temp 98.4 °F (36.9 °C)   Temp src Temporal   SpO2 97 %   O2 Device None (Room air)       Current:BP (!) 173/83   Pulse 70   Temp 98.4 °F (36.9 °C) (Te changes, normal axis and intervals, no ectopy              Ct Stroke Brain (no Iv)(cpt=70450)    Result Date: 7/24/2019  CONCLUSION:  1. No evidence of acute intracranial hemorrhage or extended signs of acute large vessel infarction (ASPECTS = 10).  2. Mild Yes

## 2023-04-24 NOTE — ED PROVIDER NOTE - CLINICAL SUMMARY MEDICAL DECISION MAKING FREE TEXT BOX
57yo M with hx DM and HTN presents with HTN and asymptomatic. In setting of police custody will give home med lisinopril 40mg and HCTZ 12.5mg prior to discharge.

## 2023-04-24 NOTE — ED ADULT NURSE NOTE - CHIEF COMPLAINT QUOTE
as per  Clifford ' His blood pressure is high "  Patient denies pain, asking " do you have anything to eat "  Patient admits to missing his medication for BP today only.

## 2023-04-24 NOTE — ED PROVIDER NOTE - NSFOLLOWUPINSTRUCTIONS_ED_ALL_ED_FT
Continue your daily medications  Followup with PMD for reevaluation.  Return to ED if you develop worsening symptoms

## 2023-04-24 NOTE — ED ADULT TRIAGE NOTE - CHIEF COMPLAINT QUOTE
as per  Clfiford ' His blood pressure is high "  Patient denies pain, asking " do you have anything to eat "  Patient admits to missing his medication for BP today only.

## 2023-04-24 NOTE — ED PROVIDER NOTE - PATIENT PORTAL LINK FT
You can access the FollowMyHealth Patient Portal offered by [facility] by registering at the following website: [email or web address]. By joining [employer]’s FollowMyHealth portal, you will also be able to view your health information using other applications (apps) compatible with our system.

## 2023-04-24 NOTE — ED ADULT NURSE NOTE - NSFALLRSKOUTCOME_ED_ALL_ED
MD Notification    Notified Person: MD    Notified Person Name: MARLEE Carreno    Notification Date/Time: 1430 10/10    Notification Interaction: phone    Purpose of Notification: Low BPs throughout day. Asymptomatic. Ok to discharge?    Orders Received: ok to discharge, encourage fluids    Comments:     Fall Risk
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
## 2023-09-27 ENCOUNTER — EMERGENCY (EMERGENCY)
Facility: HOSPITAL | Age: 58
LOS: 1 days | Discharge: HOME | End: 2023-09-27
Attending: EMERGENCY MEDICINE | Admitting: EMERGENCY MEDICINE
Payer: MEDICAID

## 2023-09-27 VITALS
SYSTOLIC BLOOD PRESSURE: 172 MMHG | DIASTOLIC BLOOD PRESSURE: 88 MMHG | OXYGEN SATURATION: 100 % | TEMPERATURE: 98 F | HEART RATE: 72 BPM | RESPIRATION RATE: 16 BRPM

## 2023-09-27 VITALS
OXYGEN SATURATION: 98 % | HEART RATE: 73 BPM | DIASTOLIC BLOOD PRESSURE: 79 MMHG | SYSTOLIC BLOOD PRESSURE: 114 MMHG | TEMPERATURE: 97 F | RESPIRATION RATE: 16 BRPM

## 2023-09-27 PROCEDURE — 99283 EMERGENCY DEPT VISIT LOW MDM: CPT

## 2023-09-27 PROCEDURE — 93010 ELECTROCARDIOGRAM REPORT: CPT

## 2023-09-27 NOTE — ED PROVIDER NOTE - NS ED ATTENDING STATEMENT MOD
This was a shared visit with the MARGIE. I reviewed and verified the documentation and independently performed the documented:

## 2023-09-27 NOTE — PROVIDER CONTACT NOTE (OTHER) - ASSESSMENT
Pt is discharged. Met with pt, he is alert, oriented x4, ambulatory with no assistance. Pt asked for a [employer] Card, provided. SW walked him to the main lobby and showed the bus outside. Pt walked out to the bus stop.

## 2023-09-27 NOTE — ED PROVIDER NOTE - NSICDXPASTMEDICALHX_GEN_ALL_CORE_FT
PAST MEDICAL HISTORY:  Diabetes mellitus     DM (diabetes mellitus)     H/O non-ST elevation myocardial infarction (NSTEMI)     H/O unstable angina     HTN (hypertension)     Hypertension     Migraine     LEON on CPAP

## 2023-09-27 NOTE — ED PROVIDER NOTE - PATIENT PORTAL LINK FT
You can access the FollowMyHealth Patient Portal offered by [facility] by registering at the following website: [email or web address]. By joining [employer]’s FollowMyHealth portal, you will also be able to view your health information using other applications (apps) compatible with our system.

## 2023-09-27 NOTE — ED ADULT NURSE NOTE - NSFALLOOBREASON_ED_ALL_ED
How Severe Is It?: moderate
Is This A New Presentation, Or A Follow-Up?: Follow Up Isotretinoin
Cognitively impaired/Impulsive
Stable

## 2023-09-27 NOTE — ED PROVIDER NOTE - CLINICAL SUMMARY MEDICAL DECISION MAKING FREE TEXT BOX
57 y/o male pmh htn, dm, polysubstance abuse BIBEMS after being found sleeping on a lawn. Pt found with pinpoint pupils and brought to ER. pt is lethargic but arousable on exam, follows commands, no neuro deficits. Pt admits to snorting cocaine and heroin but wont quantify how much. Pt is breathing spontaneously, no neuro deficits, no signs of trauma. Pt is slurring his speech. S/p cocaine and heroin use, low concern for overdose at this time. Will monitor in the ER until clinically sober.
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
## 2023-09-27 NOTE — ED PROVIDER NOTE - ATTENDING APP SHARED VISIT CONTRIBUTION OF CARE
PTED s/p ¨speedball"animated agitated but redirectable not a threat to self/others; will observe/serial exams and if improves will d/c

## 2023-09-27 NOTE — ED ADULT NURSE NOTE - NSFALLRISKINTERV_ED_ALL_ED
Assistance OOB with selected safe patient handling equipment if applicable/Assistance with ambulation/Communicate fall risk and risk factors to all staff, patient, and family/Monitor gait and stability/Monitor for mental status changes and reorient to person, place, and time, as needed/Move patient closer to nursing station/within visual sight of ED staff/Provide visual cue: yellow wristband, yellow gown, etc/Reinforce activity limits and safety measures with patient and family/Toileting schedule using arm’s reach rule for commode and bathroom/Use of alarms - bed, stretcher, chair and/or video monitoring/Call bell, personal items and telephone in reach/Instruct patient to call for assistance before getting out of bed/chair/stretcher/Non-slip footwear applied when patient is off stretcher/[place] to call system/Physically safe environment - no spills, clutter or unnecessary equipment/Purposeful Proactive Rounding/Room/bathroom lighting operational, light cord in reach

## 2023-09-27 NOTE — ED ADULT TRIAGE NOTE - CHIEF COMPLAINT QUOTE
Pt. found sleeping outside on lawn. Pupils noted to be pinpoint. Denies any drug or alcohol use. Pt falling asleep but when awake following commands. Phx: HTN, DMT2

## 2023-09-27 NOTE — ED ADULT NURSE NOTE - OBJECTIVE STATEMENT
Found by EMS on the floor, patient appears erratic, jumps out of bed, punches air and turns goes back to bed.

## 2023-09-27 NOTE — ED PROVIDER NOTE - NSFOLLOWUPINSTRUCTIONS_ED_ALL_ED_FT
You have been given information necessary to follow up with the  [facility] ([facility]) Crisis center & other outpatient  psychiatric clinics within your community    • [facility] walk in Crisis centre  [address] [place] [address] [phone] [email or web address]-center  Hours of operation:  Day	                                        Hours                                    Closed  Monday                                9am - 3pm  Tuesday                                9am - 3pm  Wednesday                          9am - 3pm  Thursday                               9am - 3pm  Friday                                    9am - 3pm  Saturday                                Closed    .....additionally if your current problem is associated with drug or alcohol abuse further information can be obtained at the Drug Abuse Evaluation Health Referral Servce (DAEHRS)    • [facility] clinic [address] [place] [address] [phone] [email or web address]/programs-[email or web address]/[email or web address]-health-referral-service    Additionally if more support and information and help is needed in the area of suicide prevention pleas3 feel free to contact :   • Suicide Prevention [place] [address]  Phone: 1-036-351-XKFL (6773)  Web Address: http://www.suicidepreventionlifeline.org  • Suicide Awareness Voices of Education  [address] [place], [place] [address]  Phone: 1-488.322.8166  Web Address: http://www.save.org    Polysubstance Abuse    WHAT YOU NEED TO KNOW:    Polysubstance abuse is the abuse of 2 or more drugs that cause impairment or distress. Examples include alcohol, nicotine, marijuana, cocaine, heroin, methamphetamine, hallucinogens such as mushrooms, or inhalants such as paint thinner. Prescribed medicines, such as opioids for pain or benzodiazepines for anxiety, are also commonly abused.    DISCHARGE INSTRUCTIONS:    Call your local emergency number (110 in the [place]) if:    You feel you might harm yourself or others.    Return to the emergency department if:    You have a seizure.    You have chest pain and your heart is beating faster than usual.    You have new shortness of breath.    You are dizzy and lightheaded.  Call your doctor or therapist if:    You are using drugs and think you are pregnant.    You have withdrawal symptoms and want to start using drugs again.    You have questions or concerns about your condition or care.  Risks of polysubstance abuse:    Drug dependence is when you continue to use drugs, even when you know the risks. Polysubstance abuse can damage your heart, brain, lungs, liver, and gastrointestinal tract. You continue even when it causes problems with work, school, or relationships. You may have difficulty finding or keeping a job because of your drug dependence.    Drug tolerance is when you need to use more drugs, or use them more often, to get the effects you want. You may not be able to stop using the drugs. When you try to stop, you may have withdrawal symptoms and strong cravings for the drugs.    Drug overdose can occur when you take more drugs than your body can handle. This may be a small amount or a large amount. You can lose consciousness or have a seizure or stroke. Your heart can stop beating, or you can stop breathing. You may die from a drug overdose.  Medicines:    Withdrawal medicines may be given according to the types of drugs you are abusing. Withdrawal from drugs can cause serious, life-threatening side effects. Certain medicines can help decrease your withdrawal symptoms and your desire for the drug. Ask for more information about the withdrawal medicines you may need.    Mood stabilizers may be given to help prevent or treat depression or anxiety caused by drug abuse and withdrawal.    Take your medicine as directed. Contact your healthcare provider if you think your medicine is not helping or if you have side effects. Tell him or her if you are allergic to any medicine. Keep a list of the medicines, vitamins, and herbs you take. Include the amounts, and when and why you take them. Bring the list or the pill bottles to follow-up visits. Carry your medicine list with you in case of an emergency.  Follow up with your doctor or therapist as directed: You may be referred to a specialist to treat health conditions caused by your drug use. This includes mental health, heart, or lung specialists. Write down your questions so you remember to ask them during your visits.    Therapy: You may need therapy and support to stop using drugs:    Cognitive and behavioral therapy helps you change your thinking and behavior. It can help you develop plans to avoid the situations that make you want to use drugs. It also helps you cope with the feelings of wanting to use drugs. You may have individual or group therapy.    Contingency management helps you set drug-free goals with a therapist. You will decide ways to celebrate your success when you reach a goal.    Family therapy and support groups allow you and your family members to talk to and be encouraged by other people affected by drug abuse. You and your family members may attend together or separately. Ask your healthcare provider for information about programs in your area.  How polysubstance abuse affects unborn or  babies:    If you are pregnant or get pregnant while using drugs, you may have a miscarriage or give birth early. Your baby may be born addicted to the drugs.    Do not breastfeed your baby if you use drugs. Drugs pass from your bloodstream into your breast milk and affect your baby's health. Talk with your healthcare provider if you are using drugs and breastfeeding.  For support and more information:    Alcoholics Anonymous  Web Address: http://www.aa.org  Substance Abuse and Mental Health Services Administration  [address]  [place] [address]  Web Address: http://www.[facility].gov    Abuso de múltiples drogas    LO QUE NECESITA SABER:    El abuso de múltiples drogas es el consumo de 2 o más drogas que pueden causar daños o angustia. Algunos ejemplos incluyen el abuso al alcohol, nicotina, marihuana, cocaína, heroína, metanfetamina, alucinógenos rudi setas, o sustancias que se inhalan rudi disolvente de pintura. También es común el abuso de medicamentos de venta con receta médica, rudi los opiáceos para combatir el dolor o las benzodiazepinas para combatir la ansiedad.    INSTRUCCIONES SOBRE EL NAMAN HOSPITALARIA:    Llame al número de emergencias local (911 en los [place]) si:    Usted siente que podría lastimarse o lastimar a otros personas.    Regrese a la honorio de emergencias si:    Usted sufre nanda convulsión.    A usted le duele el pecho o el corazón le late más rápido de lo normal.    Usted comienza a sentir falta de aire    Usted se siente mareado y aturdido.  Llame a jones médico o terapeuta si:    Usted está usando drogas y carmen que está embarazada.    Usted sufre síntomas de abstinencia y desea volver a consumir drogas.    Usted tiene preguntas o inquietudes acerca de jones condición o cuidado.  Riesgos del abuso de múltiples drogas:    Dependencia de drogases cuando se sigue consumiendo drogas, a pesar de saber los riesgos. El abuso de múltiples drogas puede dañar el corazón, el cerebro, los pulmones, el hígado y el tracto gastrointestinal. Usted continúa el consumo incluso si le produce problemas en el trabajo, en la escuela o en vira relaciones. Es posible que tenga dificultad para conseguir o mantener un trabajo a causa de jones dependencia de las drogas.    Tolerancia a las drogasse da cuando necesita más drogas o debe consumirlas más a menudo para obtener los efectos deseados. Es posible que no pueda dejar de consumir las drogas. Cuando intenta dejar, puede tener síntomas de abstinencia y wu deseos de consumir las drogas.    Sobredosis de drogaspuede ocurrir cuando laura más droga de la que jones organismo puede asimilar. Se puede tratar de nanda cantidad deep o pequeña. Puede perder el conocimiento o sufrir convulsiones o un derrame cerebral. Es posible que el corazón deje de latir o puede dejar de respirar. Nanda sobredosis de droga puede causarle la muerte.  Medicamentos:    Medicamentos para la abstinenciase pueden chris según los tipos de drogas que consume. La abstinencia puede provocar efectos secundarios muy graves que pueden llegar a causar la muerte. Ciertos medicamentos pueden ayudar a disminuir los síntomas de abstinencia y jones deseo de consumo. Pida más información sobre los medicamentos de abstinencia que puede necesitar.    Estabilizadores del estado de ánimode ánimo para ayudar a prevenir o tratar la depresión o la ansiedad que causa el consumo de drogas y la abstinencia.    [place] vira medicamentos rudi se le haya indicado.Consulte con jones médico si usted carmen que jones medicamento no le está ayudando o si presenta efectos secundarios. Infórmele si es alérgico a cualquier medicamento. Mantenga nanda lista actualizada de los medicamentos, las vitaminas y los productos herbales que laura. Incluya los siguientes datos de los medicamentos: cantidad, frecuencia y motivo de administración. Traiga con usted la lista o los envases de las píldoras a vira citas de seguimiento. Lleve la lista de los medicamentos con usted en micheal de nanda emergencia.  Acuda a vira consultas de control con jones médico o terapeuta según le indicaron:A usted podrían referirlo a un especialista para tratar el estado de ester que le ha provocado el consumo de drogas. Normalmente son especialistas en ester mental, corazón o pulmones. Anote vira preguntas para que se acuerde de hacerlas misti vira visitas.    Terapia:Es posible que requiera terapia y apoyo para dejar de usar drogas:    La terapia cognitiva y de comportamientole ayuda a cambiar vira pensamientos y conducta. Puede ayudarle a desarrollar planes para evitar situaciones que despierten el deseo de consumir drogas. Además le ayudará a afrontar los sentimientos de deseo de usar drogas. Puede someterse a nanda terapia individual o grupal    El control de contingenciasirve para establecer metas para dejar las drogas con la ayuda de un terapeuta. Usted decidirá de qué forma va a celebrar cuando logre cumplir con nanda meta.    La terapia familiar y grupos de apoyole permitirán a usted y a vira familiares hablar y sentirse respaldado por otras personas afectadas también por el consumo. Usted y vira familiares pueden asistir juntos o por separado. Pida a jones médico información sobre los programas locales.  La forma cómo el consumo de múltiples drogas afecta al feto o a los bebés recién nacidos:    Si está embarazada o queda embarazada cuando consume drogas, puede sufrir un aborto espontáneo o chris a vika antes de tiempo. Es posible que jones bebé nazca con nanda adicción a las drogas.    No alimente a jones bruce con leche materna si esta usando drogas. Las drogas pasan a la leche materna por el torrente sanguíneo pritesh y afectan la ester de jones bebé. Consulte con jones médico si está usando drogas y dando de lactar.  Para apoyo y más información:    Alcoholics Anonymous  Web Address: http://www.aa.org  Substance Abuse and Mental Health Services Administration  [address]  [place] [address]  Web Address: http://www.samhsa.gov      RESOURCES FOR THE HOMELESS, HUNGRY & UNEMPLOYED    Homeless Emergency Hotline  1-895.917.5814    Homeless Outreach -- 244.143.5540. (24 * 7 [facility])    [facility] Dept. of Homeless Services  [address] [place] [phone]    INTAKE AND ASSESSMENT CENTERS    For Men  [address] [place] [address] Men's Assessment Shelter  [address] [address]  [phone] or [phone].  Take #6 train - to [address], [address] to [address]. (Next to [facility])    FOR WOMEN  [address] [facility]. Between [facility] & [place]  Phone #[phone].    HELP WOMEN Assessment Shelter - [phone] ext - 104 or 122.  J train to [facility] and then C train towards [place]  Get off at [facility] Women's Assessment Shelter ([place] [facility])  [address] [facility] at [address]  [phone]    FAMILIES   For Families - [facility] - [place] [facility]  [address] [place] [address].  # [phone] or [phone]  Take #6. train to [address] and [place]  Walk one block to [facility]. Walk North three blocks to [address] and  One Block west to [place].    FOR BLIND HOMELESS PATIENTS OR THOSE WITH PHYSICAL HANDICAPS (MEN)  [address] [place] [address]-PHONE # [phone] Se le ha proporcionado la información necesaria para realizar un seguimiento con el centro de crisis del [facility] ([facility]) y otras clínicas psiquiátricas ambulatorias dentro de jones comunidad.    • [facility] camina en el centro de crisis [address] [place] [address] and [address] [place] [address] [phone] [email or web address]-[email or web address]  Horas de operación:  Horas del día  Roderick cerrado  Lunes 9 am - 3 pm  Scotty 9am - 3pm  Miércoles 9 am - 3 pm  Jueves 9 am - 3 pm  Viernes 9am - 3pm  Sábado cerrado    ..... además, si jones problema actual está asociado con el abuso de drogas o alcohol, se puede obtener más información en el Servicio de Referencia de [place] de Evaluación de Abuso de Drogas (DAEHRS)    • Clínica DAEHRS [address] [place] [address] [phone] [email or web address]/programs-[email or web address]/[email or web address]-health-referral-service    Además, si se necesita más apoyo, información y ayuda en el área de la prevención del suicidio, por favor, no dude en comunicarse con:  • Línea directa para la prevención del [place] [address]  Teléfono: [phone] ([phone])  Dirección web: http://www.suicidepreventionlifeline.org  • Voces de educación de concienciación sobre el suicidio  [address] [place] [address]  Teléfono: [phone]  Dirección web: http://www.save.org        Abuso de múltiples drogas    LO QUE NECESITA SABER:    El abuso de múltiples drogas es el consumo de 2 o más drogas que pueden causar daños o angustia. Algunos ejemplos incluyen el abuso al alcohol, nicotina, marihuana, cocaína, heroína, metanfetamina, alucinógenos rudi setas, o sustancias que se inhalan rudi disolvente de pintura. También es común el abuso de medicamentos de venta con receta médica, rudi los opiáceos para combatir el dolor o las benzodiazepinas para combatir la ansiedad.    INSTRUCCIONES SOBRE EL NAMAN HOSPITALARIA:    Llame al número de emergencias local (911 en los [place]) si:    Usted siente que podría lastimarse o lastimar a otros personas.    Regrese a la honorio de emergencias si:    Usted sufre nanda convulsión.    A usted le duele el pecho o el corazón le late más rápido de lo normal.    Usted comienza a sentir falta de aire    Usted se siente mareado y aturdido.  Llame a jones médico o terapeuta si:    Usted está usando drogas y carmen que está embarazada.    Usted sufre síntomas de abstinencia y desea volver a consumir drogas.    Usted tiene preguntas o inquietudes acerca de jones condición o cuidado.  Riesgos del abuso de múltiples drogas:    Dependencia de drogases cuando se sigue consumiendo drogas, a pesar de saber los riesgos. El abuso de múltiples drogas puede dañar el corazón, el cerebro, los pulmones, el hígado y el tracto gastrointestinal. Usted continúa el consumo incluso si le produce problemas en el trabajo, en la escuela o en vira relaciones. Es posible que tenga dificultad para conseguir o mantener un trabajo a causa de jones dependencia de las drogas.    Tolerancia a las drogasse da cuando necesita más drogas o debe consumirlas más a menudo para obtener los efectos deseados. Es posible que no pueda dejar de consumir las drogas. Cuando intenta dejar, puede tener síntomas de abstinencia y wu deseos de consumir las drogas.    Sobredosis de drogaspuede ocurrir cuando laura más droga de la que jones organismo puede asimilar. Se puede tratar de nanda cantidad deep o pequeña. Puede perder el conocimiento o sufrir convulsiones o un derrame cerebral. Es posible que el corazón deje de latir o puede dejar de respirar. Nanda sobredosis de droga puede causarle la muerte.  Medicamentos:    Medicamentos para la abstinenciase pueden chris según los tipos de drogas que consume. La abstinencia puede provocar efectos secundarios muy graves que pueden llegar a causar la muerte. Ciertos medicamentos pueden ayudar a disminuir los síntomas de abstinencia y jones deseo de consumo. Pida más información sobre los medicamentos de abstinencia que puede necesitar.    Estabilizadores del estado de ánimode ánimo para ayudar a prevenir o tratar la depresión o la ansiedad que causa el consumo de drogas y la abstinencia.    [place] vira medicamentos rudi se le haya indicado.Consulte con jones médico si usted carmen que jones medicamento no le está ayudando o si presenta efectos secundarios. Infórmele si es alérgico a cualquier medicamento. Mantenga nanda lista actualizada de los medicamentos, las vitaminas y los productos herbales que laura. Incluya los siguientes datos de los medicamentos: cantidad, frecuencia y motivo de administración. Traiga con usted la lista o los envases de las píldoras a vira citas de seguimiento. Lleve la lista de los medicamentos con usted en micheal de nanda emergencia.  Acuda a vira consultas de control con jones médico o terapeuta según le indicaron:A usted podrían referirlo a un especialista para tratar el estado de lanette que le ha provocado el consumo de drogas. Normalmente son especialistas en lanette mental, corazón o pulmones. Anote vira preguntas para que se acuerde de hacerlas misti vira visitas.    Terapia:Es posible que requiera terapia y apoyo para dejar de usar drogas:    La terapia cognitiva y de comportamientole ayuda a cambiar vira pensamientos y conducta. Puede ayudarle a desarrollar planes para evitar situaciones que despierten el deseo de consumir drogas. Además le ayudará a afrontar los sentimientos de deseo de usar drogas. Puede someterse a nanda terapia individual o grupal    El control de contingenciasirve para establecer metas para dejar las drogas con la ayuda de un terapeuta. Usted decidirá de qué forma va a celebrar cuando logre cumplir con nanda meta.    La terapia familiar y grupos de apoyole permitirán a usted y a vira familiares hablar y sentirse respaldado por otras personas afectadas también por el consumo. Usted y vira familiares pueden asistir juntos o por separado. Pida a jones médico información sobre los programas locales.  La forma cómo el consumo de múltiples drogas afecta al feto o a los bebés recién nacidos:    Si está embarazada o queda embarazada cuando consume drogas, puede sufrir un aborto espontáneo o chris a vika antes de tiempo. Es posible que jones bebé nazca con nanda adicción a las drogas.    No alimente a jones bruce con leche materna si esta usando drogas. Las drogas pasan a la leche materna por el torrente sanguíneo pritesh y afectan la lanette de jones bebé. Consulte con jones médico si está usando drogas y dando de lactar.  Para apoyo y más información:    Alcoholics Anonymous  Web Address: http://www.aa.org  Substance Abuse and Mental Health Services Administration  [address]  [place] [address]  Web Address: http://www.[facility].gov    You have been given information necessary to follow up with the  [facility] ([facility]) Crisis center & other outpatient  psychiatric clinics within your community    • [facility] walk in Crisis centre [address] [place] [address] and [address] [place] [address] [phone] [email or web address]/[email or web address]-health/programs-services/[email or web address]-health-crisis-center  Hours of operation:  Day	                                        Hours                                    Closed  Monday                                9am - 3pm  Tuesday                                9am - 3pm  Wednesday                          9am - 3pm  Thursday                               9am - 3pm  Friday                                    9am - 3pm  Saturday                                Closed    .....additionally if your current problem is associated with drug or alcohol abuse further information can be obtained at the Drug Abuse Evaluation Health Referral Servce (DAEHRS)    • D[facility] clinic [address] [place] [address] [phone] https://[email or web address].[facility]/behavioral-health/programs-services/drug-abuse-evaluation-health-referral-service    Additionally if more support and information and help is needed in the area of suicide prevention pleas3 feel free to contact :   • [place] [address]  Phone: 7-826-398-NKGM (6684)  Web Address: http://www.suicidepreventionlifeline.org  • Suicide Awareness Voices of Education  [address] [place], [place] [address]  Phone: 1-996.515.3092  Web Address: http://www.save.org    Polysubstance Abuse    WHAT YOU NEED TO KNOW:    Polysubstance abuse is the abuse of 2 or more drugs that cause impairment or distress. Examples include alcohol, nicotine, marijuana, cocaine, heroin, methamphetamine, hallucinogens such as mushrooms, or inhalants such as paint thinner. Prescribed medicines, such as opioids for pain or benzodiazepines for anxiety, are also commonly abused.    DISCHARGE INSTRUCTIONS:    Call your local emergency number (911 in the ) if:    You feel you might harm yourself or others.    Return to the emergency department if:    You have a seizure.    You have chest pain and your heart is beating faster than usual.    You have new shortness of breath.    You are dizzy and lightheaded.  Call your doctor or therapist if:    You are using drugs and think you are pregnant.    You have withdrawal symptoms and want to start using drugs again.    You have questions or concerns about your condition or care.  Risks of polysubstance abuse:    Drug dependence is when you continue to use drugs, even when you know the risks. Polysubstance abuse can damage your heart, brain, lungs, liver, and gastrointestinal tract. You continue even when it causes problems with work, school, or relationships. You may have difficulty finding or keeping a job because of your drug dependence.    Drug tolerance is when you need to use more drugs, or use them more often, to get the effects you want. You may not be able to stop using the drugs. When you try to stop, you may have withdrawal symptoms and strong cravings for the drugs.    Drug overdose can occur when you take more drugs than your body can handle. This may be a small amount or a large amount. You can lose consciousness or have a seizure or stroke. Your heart can stop beating, or you can stop breathing. You may die from a drug overdose.  Medicines:    Withdrawal medicines may be given according to the types of drugs you are abusing. Withdrawal from drugs can cause serious, life-threatening side effects. Certain medicines can help decrease your withdrawal symptoms and your desire for the drug. Ask for more information about the withdrawal medicines you may need.    Mood stabilizers may be given to help prevent or treat depression or anxiety caused by drug abuse and withdrawal.    Take your medicine as directed. Contact your healthcare provider if you think your medicine is not helping or if you have side effects. Tell him or her if you are allergic to any medicine. Keep a list of the medicines, vitamins, and herbs you take. Include the amounts, and when and why you take them. Bring the list or the pill bottles to follow-up visits. Carry your medicine list with you in case of an emergency.  Follow up with your doctor or therapist as directed: You may be referred to a specialist to treat health conditions caused by your drug use. This includes mental health, heart, or lung specialists. Write down your questions so you remember to ask them during your visits.    Therapy: You may need therapy and support to stop using drugs:    Cognitive and behavioral therapy helps you change your thinking and behavior. It can help you develop plans to avoid the situations that make you want to use drugs. It also helps you cope with the feelings of wanting to use drugs. You may have individual or group therapy.    Contingency management helps you set drug-free goals with a therapist. You will decide ways to celebrate your success when you reach a goal.    Family therapy and support groups allow you and your family members to talk to and be encouraged by other people affected by drug abuse. You and your family members may attend together or separately. Ask your healthcare provider for information about programs in your area.  How polysubstance abuse affects unborn or  babies:    If you are pregnant or get pregnant while using drugs, you may have a miscarriage or give birth early. Your baby may be born addicted to the drugs.    Do not breastfeed your baby if you use drugs. Drugs pass from your bloodstream into your breast milk and affect your baby's health. Talk with your healthcare provider if you are using drugs and breastfeeding.  For support and more information:    Alcoholics Anonymous  Web Address: http://www.aa.org  Substance Abuse and Mental Health Services Administration  [address]  [place] [address]  Web Address: http://www.samhsa.gov      RESOURCES FOR THE HOMELESS, HUNGRY & UNEMPLOYED    Homeless Emergency Hotline  1-306.329.2038    Homeless Outreach -- 213.462.1173. (24 * 7 [facility])    [facility]  [address] [place] [phone]    INTAKE AND ASSESSMENT CENTERS    For Men  [facility] Assessment Shelter  [address] [address] [phone] or [phone].  Take #6 train - to [address], [address] to [address]. (Next to [facility])    FOR WOMEN  [address] [facility]. Between [facility] & [place]  Phone #[phone].    HELP WOMEN Assessment Shelter - [phone] ext - [phone] or 122.  J train to [facility] and then C train towards [place]  Get off at [facility] ([facility] [facility])  [address] [facility] [place] [facility] [id number]  [phone]    FAMILIES   For Families - PATH - [facility]  [address] [place] [address].  # [phone] or [phone]  Take #6. train to [address] and [place]  Walk one block to [facility]. Walk North three blocks to [address] and  One Block west to [place].    FOR BLIND HOMELESS PATIENTS OR THOSE WITH PHYSICAL HANDICAPS (MEN)  [address] [place] [address]-PHONE # [phone]